# Patient Record
Sex: MALE | Race: WHITE | URBAN - METROPOLITAN AREA
[De-identification: names, ages, dates, MRNs, and addresses within clinical notes are randomized per-mention and may not be internally consistent; named-entity substitution may affect disease eponyms.]

---

## 2017-05-09 ENCOUNTER — OFFICE VISIT - HEALTHEAST (OUTPATIENT)
Dept: FAMILY MEDICINE | Facility: CLINIC | Age: 32
End: 2017-05-09

## 2017-05-09 DIAGNOSIS — J06.9 URI (UPPER RESPIRATORY INFECTION): ICD-10-CM

## 2017-05-19 ENCOUNTER — OFFICE VISIT - HEALTHEAST (OUTPATIENT)
Dept: FAMILY MEDICINE | Facility: CLINIC | Age: 32
End: 2017-05-19

## 2017-05-19 DIAGNOSIS — R05.9 COUGH: ICD-10-CM

## 2017-06-28 ENCOUNTER — OFFICE VISIT - HEALTHEAST (OUTPATIENT)
Dept: FAMILY MEDICINE | Facility: CLINIC | Age: 32
End: 2017-06-28

## 2017-06-28 DIAGNOSIS — R10.32 LLQ ABDOMINAL PAIN: ICD-10-CM

## 2017-06-28 DIAGNOSIS — K57.92 DIVERTICULITIS: ICD-10-CM

## 2017-06-29 ENCOUNTER — COMMUNICATION - HEALTHEAST (OUTPATIENT)
Dept: FAMILY MEDICINE | Facility: CLINIC | Age: 32
End: 2017-06-29

## 2017-06-29 DIAGNOSIS — R05.9 COUGH: ICD-10-CM

## 2018-01-15 ENCOUNTER — OFFICE VISIT - HEALTHEAST (OUTPATIENT)
Dept: FAMILY MEDICINE | Facility: CLINIC | Age: 33
End: 2018-01-15

## 2018-01-15 DIAGNOSIS — R50.9 FEVER: ICD-10-CM

## 2018-01-15 LAB
FLUAV AG SPEC QL IA: NORMAL
FLUBV AG SPEC QL IA: NORMAL

## 2018-01-18 ENCOUNTER — OFFICE VISIT - HEALTHEAST (OUTPATIENT)
Dept: FAMILY MEDICINE | Facility: CLINIC | Age: 33
End: 2018-01-18

## 2018-01-18 ENCOUNTER — COMMUNICATION - HEALTHEAST (OUTPATIENT)
Dept: FAMILY MEDICINE | Facility: CLINIC | Age: 33
End: 2018-01-18

## 2018-01-18 DIAGNOSIS — R12 HEARTBURN: ICD-10-CM

## 2018-01-18 DIAGNOSIS — R05.9 COUGH: ICD-10-CM

## 2018-01-18 DIAGNOSIS — J06.9 URI (UPPER RESPIRATORY INFECTION): ICD-10-CM

## 2018-01-18 RX ORDER — ALBUTEROL SULFATE 90 UG/1
2 AEROSOL, METERED RESPIRATORY (INHALATION) EVERY 4 HOURS PRN
Qty: 1 INHALER | Refills: 0 | Status: SHIPPED | OUTPATIENT
Start: 2018-01-18 | End: 2022-06-29

## 2018-01-23 ENCOUNTER — OFFICE VISIT - HEALTHEAST (OUTPATIENT)
Dept: FAMILY MEDICINE | Facility: CLINIC | Age: 33
End: 2018-01-23

## 2018-01-23 DIAGNOSIS — R05.9 COUGH: ICD-10-CM

## 2018-04-15 ENCOUNTER — COMMUNICATION - HEALTHEAST (OUTPATIENT)
Dept: FAMILY MEDICINE | Facility: CLINIC | Age: 33
End: 2018-04-15

## 2018-04-15 DIAGNOSIS — R12 HEARTBURN: ICD-10-CM

## 2018-06-01 ENCOUNTER — OFFICE VISIT - HEALTHEAST (OUTPATIENT)
Dept: FAMILY MEDICINE | Facility: CLINIC | Age: 33
End: 2018-06-01

## 2018-06-01 DIAGNOSIS — R05.9 COUGH: ICD-10-CM

## 2018-06-01 RX ORDER — CODEINE PHOSPHATE AND GUAIFENESIN 10; 100 MG/5ML; MG/5ML
10 SOLUTION ORAL
Qty: 250 ML | Refills: 0 | Status: SHIPPED | OUTPATIENT
Start: 2018-06-01 | End: 2022-06-29

## 2018-06-01 RX ORDER — AZITHROMYCIN 250 MG/1
TABLET, FILM COATED ORAL
Qty: 6 TABLET | Refills: 0 | Status: SHIPPED | OUTPATIENT
Start: 2018-06-01 | End: 2022-06-29

## 2018-07-18 ENCOUNTER — COMMUNICATION - HEALTHEAST (OUTPATIENT)
Dept: HEALTH INFORMATION MANAGEMENT | Facility: CLINIC | Age: 33
End: 2018-07-18

## 2018-07-18 ENCOUNTER — COMMUNICATION - HEALTHEAST (OUTPATIENT)
Dept: TELEHEALTH | Facility: CLINIC | Age: 33
End: 2018-07-18

## 2018-10-22 ENCOUNTER — OFFICE VISIT - HEALTHEAST (OUTPATIENT)
Dept: FAMILY MEDICINE | Facility: CLINIC | Age: 33
End: 2018-10-22

## 2018-10-22 DIAGNOSIS — M26.609 TMJ (TEMPOROMANDIBULAR JOINT SYNDROME): ICD-10-CM

## 2019-11-08 ENCOUNTER — OFFICE VISIT - HEALTHEAST (OUTPATIENT)
Dept: FAMILY MEDICINE | Facility: CLINIC | Age: 34
End: 2019-11-08

## 2019-11-08 DIAGNOSIS — H10.32 ACUTE BACTERIAL CONJUNCTIVITIS OF LEFT EYE: ICD-10-CM

## 2020-12-11 ENCOUNTER — COMMUNICATION - HEALTHEAST (OUTPATIENT)
Dept: SCHEDULING | Facility: CLINIC | Age: 35
End: 2020-12-11

## 2020-12-12 ENCOUNTER — OFFICE VISIT - HEALTHEAST (OUTPATIENT)
Dept: FAMILY MEDICINE | Facility: CLINIC | Age: 35
End: 2020-12-12

## 2020-12-12 DIAGNOSIS — R31.9 BLOOD IN URINE: ICD-10-CM

## 2020-12-12 LAB
ALBUMIN UR-MCNC: NEGATIVE MG/DL
APPEARANCE UR: CLEAR
BILIRUB UR QL STRIP: NEGATIVE
COLOR UR AUTO: YELLOW
GLUCOSE UR STRIP-MCNC: NEGATIVE MG/DL
HGB UR QL STRIP: NEGATIVE
KETONES UR STRIP-MCNC: NEGATIVE MG/DL
LEUKOCYTE ESTERASE UR QL STRIP: NEGATIVE
NITRATE UR QL: NEGATIVE
PH UR STRIP: 5 [PH] (ref 5–8)
SP GR UR STRIP: 1.01 (ref 1–1.03)
UROBILINOGEN UR STRIP-ACNC: NORMAL

## 2021-05-01 ENCOUNTER — HEALTH MAINTENANCE LETTER (OUTPATIENT)
Age: 36
End: 2021-05-01

## 2021-05-30 VITALS — WEIGHT: 280 LBS | BODY MASS INDEX: 35 KG/M2

## 2021-05-30 VITALS — WEIGHT: 277 LBS | BODY MASS INDEX: 34.62 KG/M2

## 2021-05-31 ENCOUNTER — RECORDS - HEALTHEAST (OUTPATIENT)
Dept: ADMINISTRATIVE | Facility: CLINIC | Age: 36
End: 2021-05-31

## 2021-05-31 VITALS — BODY MASS INDEX: 34.84 KG/M2 | WEIGHT: 278.7 LBS

## 2021-05-31 VITALS — BODY MASS INDEX: 34.7 KG/M2 | WEIGHT: 277.6 LBS

## 2021-05-31 VITALS — BODY MASS INDEX: 35.87 KG/M2 | WEIGHT: 287 LBS

## 2021-06-01 VITALS — WEIGHT: 273.7 LBS | BODY MASS INDEX: 34.21 KG/M2

## 2021-06-02 VITALS — WEIGHT: 279 LBS | BODY MASS INDEX: 34.87 KG/M2

## 2021-06-03 VITALS
SYSTOLIC BLOOD PRESSURE: 144 MMHG | BODY MASS INDEX: 34.85 KG/M2 | TEMPERATURE: 98.2 F | OXYGEN SATURATION: 97 % | WEIGHT: 278.8 LBS | RESPIRATION RATE: 18 BRPM | HEART RATE: 77 BPM | DIASTOLIC BLOOD PRESSURE: 90 MMHG

## 2021-06-03 NOTE — PROGRESS NOTES
Chief Complaint   Patient presents with     Eye Drainage     and redness, started this morning, swelling,          HPI:      Patient is here for redness of left eye wit discharge started 10 am today. No eye pain, foreign body sensation, photophobia, fever. He wears contact lenses.    ROS: Pertinent ROS noted in HPI.     No Known Allergies    There is no problem list on file for this patient.      Family History   Problem Relation Age of Onset     Cancer Maternal Grandmother      Diabetes Maternal Grandmother      Cancer Paternal Grandmother      COPD Paternal Grandfather      Heart disease Paternal Grandfather        Social History     Socioeconomic History     Marital status:      Spouse name: Marlyn     Number of children: 1     Years of education: Not on file     Highest education level: Not on file   Occupational History     Occupation:  in [employer]   Social Needs     Financial resource strain: Not on file     Food insecurity:     Worry: Not on file     Inability: Not on file     Transportation needs:     Medical: Not on file     Non-medical: Not on file   Tobacco Use     Smoking status: Never Smoker     Smokeless tobacco: Never Used   Substance and Sexual Activity     Alcohol use: Yes     Alcohol/week: 0.0 - 1.7 standard drinks     Drug use: No     Sexual activity: Yes     Partners: Female   Lifestyle     Physical activity:     Days per week: Not on file     Minutes per session: Not on file     Stress: Not on file   Relationships     Social connections:     Talks on phone: Not on file     Gets together: Not on file     Attends Orthodox service: Not on file     Active member of club or organization: Not on file     Attends meetings of clubs or organizations: Not on file     Relationship status: Not on file     Intimate partner violence:     Fear of current or ex partner: Not on file     Emotionally abused: Not on file     Physically abused: Not on file     Forced sexual  activity: Not on file   Other Topics Concern     Not on file   Social History Narrative     Not on file         Objective:    Vitals:    11/08/19 1741   BP: (!) 154/101   Pulse: 77   Resp: 18   Temp: 98.2  F (36.8  C)   SpO2: 97%       Gen:NAD  Eyes: moderate left conjunctival erythema with small purulent discharge. Corneas grossly clear. Normal right conjunctiva. Left eyelids slightly swollen. PERRLA, EOMI. No periorbital tenderness to palpation.       Acute bacterial conjunctivitis of left eye  -     erythromycin ophthalmic ointment; Administer to left eye four times daily for seven days.    Advised patient to not wear contacts until symptoms resolved. F/u with [facility] in 2 days if no improvement, sooner in ER should symptoms escalate.

## 2021-06-03 NOTE — PATIENT INSTRUCTIONS - HE
Follow up in the Emergency Department if your symptoms worsen.    Avoid contact lenses until your symptoms resolved for 24 hours.

## 2021-06-05 VITALS
TEMPERATURE: 98.4 F | HEART RATE: 80 BPM | BODY MASS INDEX: 35.87 KG/M2 | OXYGEN SATURATION: 96 % | RESPIRATION RATE: 18 BRPM | WEIGHT: 287 LBS | SYSTOLIC BLOOD PRESSURE: 158 MMHG | DIASTOLIC BLOOD PRESSURE: 108 MMHG

## 2021-06-10 NOTE — PROGRESS NOTES
Subjective:      Patient ID: Addi Sherman is a 31 y.o. male.    Chief Complaint:    HPI  Addi Sherman is a 31 y.o. male who presents today complaining of cough for one week. He did initially have sore throat and drainage but that is improving.  The cough has been persistent.  He is using a neti pot.  No fever that he has noticed.  He isn't sure if he has any allergies but has had symptoms like this every spring.  His daughter did recently have a cold.  He has had a history of wheezing last spring with an infection.  He has been using his albuterol some with this illness and it has been helpful.  Last dose of albuterol was this morning.      History reviewed. No pertinent past medical history.    Past Surgical History:   Procedure Laterality Date     TONSILLECTOMY AND ADENOIDECTOMY         Family History   Problem Relation Age of Onset     Cancer Maternal Grandmother      Diabetes Maternal Grandmother      Cancer Paternal Grandmother      COPD Paternal Grandfather      Heart disease Paternal Grandfather        Social History   Substance Use Topics     Smoking status: Never Smoker     Smokeless tobacco: Never Used     Alcohol use 0.0 - 1.0 oz/week     0 - 2 drink(s) per week       Review of Systems    Objective:     /80  Pulse 74  Temp 98.6  F (37  C) (Oral)   Resp 16  Wt (!) 277 lb (125.6 kg)  SpO2 99%  BMI 34.62 kg/m2    Physical Exam   Constitutional: He appears well-developed and well-nourished. No distress.   HENT:   Right Ear: Tympanic membrane and ear canal normal.   Left Ear: Tympanic membrane and ear canal normal.   Mouth/Throat: Oropharynx is clear and moist and mucous membranes are normal.   Eyes: Conjunctivae are normal.   Neck: Normal range of motion. Neck supple.   Cardiovascular: Normal rate and regular rhythm.    No murmur heard.  Pulmonary/Chest: Effort normal and breath sounds normal. No respiratory distress. He has no wheezes. He has no rales.   Lymphadenopathy:     He has no  cervical adenopathy.   Skin: No rash noted.     Procedures      Assessment / Plan:     1. URI (upper respiratory infection)  codeine-guaiFENesin (GUAIFENESIN AC)  mg/5 mL liquid    predniSONE (DELTASONE) 20 MG tablet       I suspect a viral URI.  Symptoms would also be consistent with allergies but given that both his wife and daughter have had similar symptoms and infectious etiology is more likely.  They are both improving with symptomatic treatment.  He does have some underlying bronchospasm and I suspect that is part of the reason his symptoms are persistent.  Going to treat him with prednisone 20 mg daily for 5 days.  Other symptomatic treatment as below.    Patient Instructions   1) Prednisone 20 mg daily for 5 days.  2) Robitussin AC 1-2 tsp every 4 hours as needed for cough.  Do not drive after taking this medication.  3) Follow up in 7-10 days if not improving, sooner if worsening or other concerns.   4) Continue Albuterol every 4 hours as needed.

## 2021-06-10 NOTE — PROGRESS NOTES
Chief Complaint   Patient presents with     Cough     Cold/Cough not going away        HPI:    Patient is here for almost 3 wks of cough productive with yellow sputum, with nasal congestion. NO fever, chest pain, shortness of breath. He was seen and treated with prednisone, and Guiafenesin AC without any change in the cough, although the cough med relives his cough temporarily.    ROS: Pertinent ROS noted in HPI.     No Known Allergies    There is no problem list on file for this patient.      Family History   Problem Relation Age of Onset     Cancer Maternal Grandmother      Diabetes Maternal Grandmother      Cancer Paternal Grandmother      COPD Paternal Grandfather      Heart disease Paternal Grandfather      Social History     Social History     Marital status:      Spouse name: Marlyn     Number of children: 1     Years of education: N/A     Occupational History      in [employer], [place]      Social History Main Topics     Smoking status: Never Smoker     Smokeless tobacco: Never Used     Alcohol use 0.0 - 1.0 oz/week     0 - 2 drink(s) per week     Drug use: No     Sexual activity: Yes     Partners: Female     Other Topics Concern     Not on file     Social History Narrative       Objective:    Vitals:    05/19/17 1438   BP: 120/80   Pulse: 80   Resp: 18   Temp: 98.6  F (37  C)   SpO2: 99%       Gen: NAD  Oropharynx: normal  Nose No discharge  CV: RRR,no M, R, G  Pulm: CTAB, normal effort    CXR - no focal infiltrates to suggest pneumonia a per my interpretation, discussed during visit.      Cough  -     XR Chest PA and Lateral  -     omeprazole (PRILOSEC) 20 MG capsule; Take 1 capsule (20 mg total) by mouth Daily before breakfast.    Discussed differential diagnoses with patient, including GERD vs prolong viral URI vs others. Will start PPI and have patient f/u with PCP next week if no improvement.

## 2021-06-11 NOTE — PROGRESS NOTES
Assessment:     1. Diverticulitis  ciprofloxacin HCl (CIPRO) 500 MG tablet    metroNIDAZOLE (FLAGYL) 500 MG tablet   2. LLQ abdominal pain  HM1(CBC and Differential)    HM1 (CBC with Diff)          Plan:     Left lower quadrant abdominal pain presumed to be diverticulitis.  Given that he is not having fevers and a normal white blood count, I do not think a CT scan is necessary and we will treat him empirically with ciprofloxacin and metronidazole.  I did advise him however that if he starts developing fevers, worsening pain, vomiting, or feeling like he is getting worse and not improving, I advised that he follow-up immediately and he expresses understanding.  For now recommend following a liquid diet over the next 24 hours and advance diet slowly sticking with very bland foods initially such as pasta, toast, potatoes, etc.  Discussed the importance of adequate hydration.    Subjective:       31 y.o. male with a history of diverticulitis on a couple of different occasions presents for evaluation of left lower quadrant pain that started this morning.  He states that it feels similar.  He has not had any fevers and not been doing any heavy lifting.  He denies any back pain, flank pain, or urinary symptoms.  He denies any blood in his stool or urine.  Pain is constant and does not radiate.  Describes the pain as a 6 out of 10.  Nothing makes it better or worse.  No other complaints or concerns today.    The following portions of the patient's history were reviewed and updated as appropriate: allergies, current medications, past family history, past medical history, past social history, past surgical history and problem list.    Review of Systems  A 12 point comprehensive review of systems was negative except as noted.     Objective:      /86  Pulse 92  Temp 98.5  F (36.9  C) (Oral)   Resp 12  Wt (!) 278 lb 11.2 oz (126.4 kg)  SpO2 97%  BMI 34.84 kg/m2  General appearance: alert, appears stated age and  cooperative  Throat: lips, mucosa, and tongue normal; teeth and gums normal  Neck: no adenopathy, supple, symmetrical, trachea midline and thyroid not enlarged, symmetric, no tenderness/mass/nodules  Lungs: clear to auscultation bilaterally  Heart: regular rate and rhythm, S1, S2 normal, no murmur, click, rub or gallop  Abdomen: Left lower quadrant tenderness noted without rebound or guarding.  No other abdominal tenderness.  Bowel sounds are present in all 4 quadrants.  No organomegaly noted.  No distention noted  Extremities: extremities normal, atraumatic, no cyanosis or edema     Recent Results (from the past 24 hour(s))   HM1 (CBC with Diff)   Result Value Ref Range    WBC 9.7 4.0 - 11.0 thou/uL    RBC 4.95 4.40 - 6.20 mill/uL    Hemoglobin 15.6 14.0 - 18.0 g/dL    Hematocrit 44.7 40.0 - 54.0 %    MCV 90 80 - 100 fL    MCH 31.5 27.0 - 34.0 pg    MCHC 34.8 32.0 - 36.0 g/dL    RDW 11.5 11.0 - 14.5 %    Platelets 259 140 - 440 thou/uL    MPV 6.9 (L) 7.0 - 10.0 fL    Neutrophils % 66 50 - 70 %    Lymphocytes % 24 20 - 40 %    Monocytes % 6 2 - 10 %    Eosinophils % 2 0 - 6 %    Basophils % 1 0 - 2 %    Neutrophils Absolute 6.4 2.0 - 7.7 thou/uL    Lymphocytes Absolute 2.4 0.8 - 4.4 thou/uL    Monocytes Absolute 0.6 0.0 - 0.9 thou/uL    Eosinophils Absolute 0.2 0.0 - 0.4 thou/uL    Basophils Absolute 0.1 0.0 - 0.2 thou/uL         This note has been dictated using voice recognition software. Any grammatical or context distortions are unintentional and inherent to the software

## 2021-06-13 NOTE — TELEPHONE ENCOUNTER
Patient stated that he noticed he has blood in his urine.     No burning or pain with urination.  Just started now.     No fever. Pinkish.     Advised see PCP within 24 hours.  Transfer to scheduling.     Monica Soliz RN/[facility]    COVID 19 Nurse Triage Plan/Patient Instructions    Please be aware that novel coronavirus (COVID-19) may be circulating in the community. If you develop symptoms such as fever, cough, or SOB or if you have concerns about the presence of another infection including coronavirus (COVID-19), please contact your health care provider or visit [email or web address].     Disposition/Instructions    Virtual Visit with provider recommended. Reference Visit Selection Guide.    Thank you for taking steps to prevent the spread of this virus.  o Limit your contact with others.  o Wear a simple mask to cover your cough.  o Wash your hands well and often.    Resources    [facility]: About COVID-19: [email or web address]    CDC: What to Do If You're Sick: www.cdc.gov/coronavirus/2019-ncov/about/steps-when-sick.html    CDC: Ending Home Isolation: www.cdc.gov/coronavirus/2019-ncov/hcp/disposition-in-home-patients.html     CDC: Caring for Someone: www.cdc.gov/coronavirus/2019-ncov/if-you-are-sick/care-for-someone.html     [facility]: Interim Guidance for Hospital Discharge to Home: www.health.[facility].mn.us/diseases/coronavirus/hcp/hospdischarge.pdf    [facility] clinical trials (COVID-19 research studies): [email or web address]/[email or web address]-trials     Below are the COVID-19 hotlines at the [facility] ([facility]). Interpreters are available.   o For health questions: Call 319-287-4899 or 1-851.687.3814 (7 a.m. to 7 p.m.)  o For questions about schools and childcare: Call 484-126-9568 or 1-679.112.6137 (7 a.m. to 7 p.m.)       Reason for Disposition    Blood in urine  (Exception: could be normal menstrual bleeding)    Additional Information    Negative:  Shock suspected (e.g., cold/pale/clammy skin, too weak to stand, low BP, rapid pulse)    Negative: Sounds like a life-threatening emergency to the triager    Blood in the urine is main symptom    Negative: Shock suspected (e.g., cold/pale/clammy skin, too weak to stand, low BP, rapid pulse)    Negative: Sounds like a life-threatening emergency to the triager    Negative: Urinary catheter, questions about    Negative: Recent back or abdominal injury    Negative: Recent genital injury    Negative: [1] Unable to urinate (or only a few drops) > 4 hours AND [2] bladder feels very full (e.g., palpable bladder or strong urge to urinate)    Negative: Passing pure blood or large blood clots (i.e., size > a dime) (Exception: raquel or small strands)    Negative: Fever > 100.5 F (38.1 C)    Negative: Patient sounds very sick or weak to the triager    Negative: Known sickle cell disease    Negative: Taking Coumadin (warfarin) or other strong blood thinner, or known bleeding disorder (e.g., thrombocytopenia)    Negative: Side (flank) or back pain present    Negative: Pain or burning with passing urine    Negative: [1] Pink or red-colored urine and likely from food (beets, rhubarb, red food dye) AND [2] lasts > 24 hours after stopping food    Protocols used: URINE - BLOOD IN-A-AH, URINARY SYMPTOMS-A-AH

## 2021-06-13 NOTE — PROGRESS NOTES
Chief Complaint   Patient presents with     Hematuria     x Last night       HPI:  Addi Sherman is a 35 y.o. male who presents today complaining of hematuria that started last night. This hasn't happened before. No personal or family hx of kidney stones. Patient has urinated three times since the episode of hematuria and all of them have been normal. (-) chills, fever, abdominal pain, flank pain, nausea, vomiting, dysuria, urinary frequency, penile pain, testicular pain.     History obtained from the patient.    Problem List:  There are no relevant problems documented for this patient.      No past medical history on file.    Social History     Tobacco Use     Smoking status: Never Smoker     Smokeless tobacco: Never Used   Substance Use Topics     Alcohol use: Yes     Alcohol/week: 0.0 - 1.7 standard drinks       Review of Systems   Constitutional: Negative for chills and fever.   Gastrointestinal: Negative for abdominal pain, nausea and vomiting.   Genitourinary: Positive for hematuria. Negative for dysuria, flank pain, frequency, penile pain and testicular pain.       Vitals:    12/12/20 0814 12/12/20 0817   BP: 146/88 (!) 158/108   Patient Site: Right Arm Left Arm   Patient Position: Sitting Sitting   Cuff Size: Adult Large Adult Large   Pulse: 80    Resp: 18    Temp: 98.4  F (36.9  C)    TempSrc: Oral    SpO2: 96%    Weight: (!) 287 lb (130.2 kg)        Physical Exam  Vitals signs and nursing note reviewed.   Constitutional:       General: He is not in acute distress.     Appearance: He is well-developed. He is not diaphoretic.   HENT:      Head: Normocephalic and atraumatic.      Right Ear: External ear normal.      Left Ear: External ear normal.   Eyes:      General:         Right eye: No discharge.         Left eye: No discharge.      Conjunctiva/sclera: Conjunctivae normal.   Pulmonary:      Effort: Pulmonary effort is normal. No respiratory distress.   Abdominal:      General: Abdomen is flat. There is  no distension.      Tenderness: There is no abdominal tenderness. There is no right CVA tenderness, left CVA tenderness, guarding or rebound.   Neurological:      Mental Status: He is alert.   Psychiatric:         Behavior: Behavior normal.         Thought Content: Thought content normal.         Judgment: Judgment normal.           Labs:  Recent Results (from the past 72 hour(s))   Urinalysis-UC if Indicated   Result Value Ref Range    Color, UA Yellow Colorless, Yellow, Straw, Light Yellow    Clarity, UA Clear Clear    Glucose, UA Negative Negative    Bilirubin, UA Negative Negative    Ketones, UA Negative Negative    Specific Gravity, UA 1.015 1.005 - 1.030    Blood, UA Negative Negative    pH, UA 5.0 5.0 - 8.0    Protein, UA Negative Negative mg/dL    Urobilinogen, UA 0.2 E.U./dL 0.2 E.U./dL, 1.0 E.U./dL    Nitrite, UA Negative Negative    Leukocytes, UA Negative Negative       Clinical Decision Making:  Patient reports one episode of hematuria. UA is currently neg for blood or signs of infection. Etiology includes but is not limited to kidney stones, UTI, nephrotic syndrome, bladder CA. Patients symptoms were transient, and he denies any other associated symptoms. Today we will hold off on any further testing, but patient was instructed to follow up if blood returns.   At the end of the encounter, I discussed results, diagnosis, medications. Discussed red flags for immediate return to clinic/ER, as well as indications for follow up if no improvement. Patient understood and agreed to plan. Patient was stable for discharge.    1. Blood in urine  Urinalysis-UC if Indicated         Patient Instructions   Your urine is negative for any signs of infection or blood. Follow up if symptoms occur again.

## 2021-06-15 NOTE — PROGRESS NOTES
Assessment/Plan:        Diagnoses and all orders for this visit:    Heartburn  -     omeprazole (PRILOSEC) 20 MG capsule; Take 1 capsule (20 mg total) by mouth daily before breakfast.  Dispense: 90 capsule; Refill: 0    Cough  98% on room air and course rhonchi noted in Left lower lobe.   Chest xray completed in OV today, I do suspect pneumonia.   Albuterol nebulizer given in office visit - improvement in symptoms.   Will treat with albuterol inhaler, 2 puffs every 4- 6 hours regularly through current symptoms  I will also treat with Zithromax, given suspect pneumonia post influenza like illness.   Continue with Tamiflu as prescribed.   Discussed side effects of medications and proper use. Patient verbalized understanding.  Discussed home supportive care and recommended rest, fluids/ warm fluids, humidification, saline nasal spray, throat lozenges, gargle with warm salt water.    Gollow up if worsening symptoms, questions or concerns  I do recommend close follow up, early next week and he was scheduled for 1/23/2017at 0740  Discussed red flags that would warrant urgent evaluation. Patient verbalized understanding.  Addi agreed and appeared pleased with plan      -     XR Chest 2 Views  -     albuterol nebulizer solution 3 mL (PROVENTIL); Take 3 mL by nebulization once.        XR Chest 2 Views   Status:  Final result   Visible to patient:  No (Not Released) Dx:  Cough Order: [id number]         Details        Reading Physician Reading Date Result Priority       Ricci Bourgeois MD 1/18/2018            Narrative             XR CHEST 2 VIEWS  1/18/2018 9:55 AM    INDICATION: abnormal left lower lung (LLL) sounds. r/o pneumonia with recent influenza.  COMPARISON: 5/19/2017    FINDINGS: Limited inspiratory volume. Heart size appears normal. Lungs appear clear.                Subjective:    Patient ID: Addi Sherman is a 32 y.o. male.    HPI Comments: 33 y/o Addi presents today for follow up, was seen on Monday  1/5/2017 - his daughter was diagnosed with influenza on Monday.   He was seen by Dr. Onofre, his influenza was negative but given Tamifly anyway. He has been taking Tamiflu prophylaxis and taking for 10 days.   He present stoday and complains that he feels he is getting worse. States his cough is getting worse. Productive cough - dark green  Endorses diarrhea since Monday 2-3 per day, feels exteremly fatigue.   Denies wheezing, shortness of breath or chest pain, but states his chest does hurt when he coughs.   Denies sinus pressure pain  Denies fever, this has improved.   He is a nonsmoker, denies history  Denies any underlying asthma  He has been taking Tamiflu, robitussin for cough,  nyquil  Staying home from work.  His daughter was recently diagnosed with influenza, but she is improved with Tamiflu      Cough   Associated symptoms include chills, coughing, fatigue and a fever (improved). Pertinent negatives include no congestion, nausea or vomiting.       The following portions of the patient's history were reviewed and updated as appropriate: allergies, current medications, past family history, past medical history, past social history, past surgical history and problem list.    Review of Systems   Constitutional: Positive for chills, fatigue and fever (improved).   HENT: Positive for rhinorrhea. Negative for congestion, ear discharge, ear pain, sinus pain and sinus pressure.    Eyes: Negative.    Respiratory: Positive for cough. Negative for chest tightness, shortness of breath and wheezing.    Cardiovascular: Negative.    Gastrointestinal: Positive for diarrhea. Negative for nausea and vomiting.   Psychiatric/Behavioral: Negative.              Objective:    Physical Exam   Constitutional: He is oriented to person, place, and time. He appears well-developed and well-nourished. No distress.   Ill appearing   HENT:   Right Ear: External ear normal.   Left Ear: External ear normal.   Rhinorrhea, clear nasal drainage    Eyes: Conjunctivae are normal. Right eye exhibits no discharge. Left eye exhibits no discharge.   Cardiovascular: Normal rate and normal heart sounds.    No murmur heard.  Pulmonary/Chest: Effort normal. No respiratory distress. He has no wheezes. He has rales. He exhibits no tenderness.   Course rhonchi, left lower lobe. All other lobes normal lung sounds   Abdominal: Soft. Bowel sounds are normal. He exhibits no distension. There is no tenderness.   Lymphadenopathy:     He has no cervical adenopathy.   Neurological: He is alert and oriented to person, place, and time.   Skin: No rash noted. He is not diaphoretic.   Psychiatric: He has a normal mood and affect. His behavior is normal. Judgment normal.           Vitals:    01/18/18 0917   BP: 112/70   Patient Site: Left Arm   Patient Position: Sitting   Cuff Size: Adult Large   Pulse: 80   Temp: 98.2  F (36.8  C)   TempSrc: Oral   SpO2: 98%   Weight: (!) 277 lb 9.6 oz (125.9 kg)       Current Outpatient Prescriptions   Medication Sig Dispense Refill     albuterol (PROVENTIL HFA;VENTOLIN HFA) 90 mcg/actuation inhaler Inhale 2 puffs every 4 (four) hours as needed (prn cough). 1 Inhaler 0     omeprazole (PRILOSEC) 20 MG capsule Take 1 capsule (20 mg total) by mouth daily before breakfast. 90 capsule 0     oseltamivir (TAMIFLU) 75 MG capsule Take 1 capsule (75 mg total) by mouth daily for 10 days. 10 capsule 0     codeine-guaiFENesin (GUAIFENESIN AC)  mg/5 mL liquid Take 5-10 mL by mouth every 4 (four) hours as needed for cough. Do not drive or work after taking this medication. 240 mL 0     No current facility-administered medications for this visit.

## 2021-06-15 NOTE — PROGRESS NOTES
"  Subjective:      Addi Sherman is a 32 y.o. male who presents today for follow up for possible influenza.    Addi was seen by Dr. Onofre on 01/15/2018 for possible influenza and was started on Tamiflu after his daughter tested positive for influenza.  His symptoms were getting worse and was seen in the office again on 01/18/2018 and started on Azithromycin and Albuterol inhaler.  Today he states he feels better.  He continues to have a harsh, strong, productive cough of clear sputum.  Denies any more diarrhea, fatigue, fever, chills, or shortness of breath.  He endorses finishing the the Z-pack and using Albuterol every 6 hours.  He also endorses being able to go back to work yesterday since he was feeling \"much better\".  Pt has a history of asthma. Denies any wheezing, shortness of breath or nighttime symptoms.   Denies any worsening symptoms or cough with exertional symptoms.     Reviewed past medical/surgical history, family history, social history, medications and updated chart below.     No Known Allergies  History reviewed. No pertinent past medical history.  Past Surgical History:   Procedure Laterality Date     TONSILLECTOMY AND ADENOIDECTOMY       Social History     Social History     Marital status:      Spouse name: Marlyn     Number of children: 1     Years of education: N/A     Occupational History      in [employer]      Social History Main Topics     Smoking status: Never Smoker     Smokeless tobacco: Never Used     Alcohol use 0.0 - 1.0 oz/week     0 - 2 Standard drinks or equivalent per week     Drug use: No     Sexual activity: Yes     Partners: Female     Other Topics Concern     Not on file     Social History Narrative     Family History   Problem Relation Age of Onset     Cancer Maternal Grandmother      Diabetes Maternal Grandmother      Cancer Paternal Grandmother      COPD Paternal Grandfather      Heart disease Paternal Grandfather      Current " Outpatient Prescriptions   Medication Sig Dispense Refill     albuterol (PROAIR HFA;PROVENTIL HFA;VENTOLIN HFA) 90 mcg/actuation inhaler Inhale 2 puffs every 4 (four) hours as needed (prn cough). 1 Inhaler 0     codeine-guaiFENesin (GUAIFENESIN AC)  mg/5 mL liquid Take 5-10 mL by mouth every 4 (four) hours as needed for cough. Do not drive or work after taking this medication. 118 mL 0     omeprazole (PRILOSEC) 20 MG capsule Take 1 capsule (20 mg total) by mouth daily before breakfast. 90 capsule 0     oseltamivir (TAMIFLU) 75 MG capsule Take 1 capsule (75 mg total) by mouth daily for 10 days. 10 capsule 0     azithromycin (ZITHROMAX Z-EDUAR) 250 MG tablet Take 2 tablets (500 mg) on  Day 1,  followed by 1 tablet (250 mg) once daily on Days 2 through 5. 6 tablet 0     No current facility-administered medications for this visit.      There are no active problems to display for this patient.      Review of Systems   Constitutional: Negative.   HENT: Productive cough of green sputum.  Eyes: Negative.   Respiratory: Negative.   Cardiovascular: Negative.   Gastrointestinal: Negative.   Endocrine: Negative.   Genitourinary: Negative.   Musculoskeletal: Negative.   Skin: Negative.   Allergic/Immunologic: Negative.   Neurological: Negative.   Hematological: Negative.   Psychiatric/Behavioral: Negative.     Objective:    Physical Exam   /88 (Patient Site: Left Arm, Patient Position: Sitting, Cuff Size: Adult Large)  Pulse (!) 101  Temp 97.6  F (36.4  C) (Oral)   SpO2 96%    Constitutional: Alert and oriented x3, well nourished without any acute distress  Cardiovascular: Normal S1 and S2. Regular rate, rhythm and no murmur, rubs or gallops. Palpable distal pulses bilaterally.  Pulmonary/Chest: Normal effort.  Left posterior lung fields are coarse near base.  Right posterior lung fields throughout have expiratory wheezes. Lungs cleared post nebulizer.    Psychiatric: Normal mood and affect.       Assessment/Plan:     1. Cough  He continues to have a strong, harsh, productive cough with coarse lung sounds in left lung base and expiratory wheezes to right lung fields throughout.  O2 saturations in clinic are 96%.  Denies fever, chills, diarrhea, abdominal pain, and shortness of breath.  Discussed doing nebulizer in the clinic and patient is in agreement.  After nebulizer patient was fully improved with consistent O2 saturations 97%.  Continue home supportive care. Continue with albuterol 2 puffs every 4-6 hours as needed.   Follow up if patient has persistent or worsening symptoms.   Discussed red flags that would warrant urgent evaluation. Patient verbalized understanding.  Follow up if worsening symptoms, questions, or concerns.  Recommended annual physical exam or sooner for any acute concerns.     - albuterol nebulizer solution 3 mL (PROVENTIL); Take 3 mL by nebulization once.    Office visit and charting completed with Nya Powell, student NP    Eunice Sotelo, LUIS ALFREDO  1/23/2018

## 2021-06-15 NOTE — PROGRESS NOTES
ASSESSMENT/PLAN:  Pleasant 32 y.o.  male presents with cough and mild fever.  Influenza was negative but due to exposure (daughter has influenza), will treat prophylaticlly with tamiflu.  The patient may continue with OTC symptomatic treatment.  Rest, hydration, nutritional support, and time were counseled.  Follow up with primary care provider if the patient is not better in 1-2 weeks.  The patient verbalized understanding and agreed with the plan.    Fever  -     Influenza A/B Rapid Test  -     oseltamivir (TAMIFLU) 75 MG capsule; Take 1 capsule (75 mg total) by mouth daily for 10 days.  Dispense: 10 capsule; Refill: 0    Orders Placed This Encounter   Procedures     Influenza A/B Rapid Test       CHIEF COMPLAINT:  Chief Complaint   Patient presents with     possible flu     daughter has flu     Cough     Fatigue       HISTORY OF PRESENT ILLNESS:  Addi is a 32 y.o. male presenting to the clinic today with a cough and fatigue. He is accompanied by his wife and daughter.    He woke up this morning feeling fatigued and tired. He began coughing last night but it has become more frequent this morning. He lost his appetite last night and did not feel hungry. He has been trying to drink fluids as much as possible. He has had a couple episodes of emesis last night and this morning. He has been afebrile but feels warm this morning. He denies body chills. He has not received his annual influenza vaccine.    REVIEW OF SYSTEMS:   HENT: Negative.   Eyes: Negative.   Respiratory: Negative. He previously used albuterol for a pneumonia infection a couple years ago. He has not needed it since that time.  Cardiovascular: Negative.   Gastrointestinal: Negative. He previously took omeprazole for GERD but has not needed it lately.  Endocrine: Negative.   Genitourinary: Negative.   Musculoskeletal: Negative.   Skin: Negative.   Allergic/Immunologic: Negative.   Neurological: Negative.   Hematological: Negative.    Psychiatric/Behavioral: Negative.   All other systems are negative.    PFSH:  His daughter was diagnosed with influenza this morning. He and his wife are expecting their second child, a boy, soon.    TOBACCO USE:  History   Smoking Status     Never Smoker   Smokeless Tobacco     Never Used     VITALS:  Vitals:    01/15/18 0835   BP: 118/80   Pulse: (!) 108   Temp: 99  F (37.2  C)   TempSrc: Oral   SpO2: 97%   Weight: (!) 287 lb (130.2 kg)     Wt Readings from Last 3 Encounters:   01/15/18 (!) 287 lb (130.2 kg)   06/28/17 (!) 278 lb 11.2 oz (126.4 kg)   05/19/17 (!) 280 lb (127 kg)     PHYSICAL EXAM:  Constitutional: Patient is oriented to person, place, and time. Patient appears well-developed and well-nourished. No distress.   Right Ear: External ear normal.   Left Ear: External ear normal.   Nose: Nose normal.   Mouth/Throat: Oropharynx is clear and moist. No oropharyngeal exudate.   Eyes: Conjunctivae and EOM are normal. Pupils are equal, round, and reactive to light. Right eye exhibits no discharge. Left eye exhibits no discharge. No scleral icterus.   Neck: Neck supple. No JVD present. No tracheal deviation present. No thyromegaly present.   Cardiovascular: Normal rate, regular rhythm, normal heart sounds and intact distal pulses. No murmur heard.   Pulmonary/Chest: Effort normal and breath sounds normal. No stridor. No respiratory distress. Patient has no wheezes, no rales, exhibits no tenderness.  Lymphadenopathy:  Patient has no cervical adenopathy.    Results for orders placed or performed in visit on 01/15/18   Influenza A/B Rapid Test   Result Value Ref Range    Influenza  A, Rapid Antigen No Influenza A antigen detected No Influenza A antigen detected    Influenza B, Rapid Antigen No Influenza B antigen detected No Influenza B antigen detected     ADDITIONAL HISTORY SUMMARIZED (2): None.  DECISION TO OBTAIN EXTRA INFORMATION (1): None.   RADIOLOGY TESTS (1): None.  LABS (1): Ordered lab.  MEDICINE  TESTS (1): None.  INDEPENDENT REVIEW (2 each): None.     I, Joe Devi, am scribing for and in the presence of, Dr. Onofre.    I, Darius Estrella MD  personally performed the services described in this documentation, as scribed by Joe Devi in my presence, and it is both accurate and complete.    MEDICATIONS:  Current Outpatient Prescriptions   Medication Sig Dispense Refill     albuterol (PROVENTIL HFA;VENTOLIN HFA) 90 mcg/actuation inhaler Inhale 2 puffs every 4 (four) hours as needed (prn cough). 1 Inhaler 0     codeine-guaiFENesin (GUAIFENESIN AC)  mg/5 mL liquid Take 5-10 mL by mouth every 4 (four) hours as needed for cough. Do not drive or work after taking this medication. 240 mL 0     omeprazole (PRILOSEC) 20 MG capsule Take 1 capsule (20 mg total) by mouth daily before breakfast. 90 capsule 0     oseltamivir (TAMIFLU) 75 MG capsule Take 1 capsule (75 mg total) by mouth daily for 10 days. 10 capsule 0     No current facility-administered medications for this visit.        Total data points: 1

## 2021-06-18 NOTE — PROGRESS NOTES
Assessment/Plan:        Diagnoses and all orders for this visit:    Cough  -     azithromycin (ZITHROMAX) 250 MG tablet; Take 500 mg (2 x 250 mg tablets) on day 1 followed by 250 mg (1 tablet) on days 2-5.  Dispense: 6 tablet; Refill: 0  -     codeine-guaiFENesin (GUAIFENESIN AC)  mg/5 mL liquid; Take 10 mL by mouth at bedtime as needed for cough.  Dispense: 250 mL; Refill: 0    Will treat as noted above ,discussed use of other symptom control medications.Follow up as needed.    Subjective:    Patient ID: Addi Sherman is a 32 y.o. male.    Cough   This is a new (Had common cold and URI symptoms about 1 month ago which was better but he continued to cough and it is becoming worse.) problem. The current episode started 1 to 4 weeks ago. The problem occurs constantly. The problem has been gradually worsening. Associated symptoms include coughing, fatigue and headaches. Pertinent negatives include no anorexia, chest pain, chills, congestion, fever, neck pain, sore throat, swollen glands or weakness. The symptoms are aggravated by coughing and exertion. Treatments tried: using inhaler. The treatment provided mild relief.       The following portions of the patient's history were reviewed and updated as appropriate: allergies, current medications, past family history, past medical history, past social history, past surgical history and problem list.    Review of Systems   Constitutional: Positive for fatigue. Negative for chills and fever.   HENT: Positive for postnasal drip. Negative for congestion, ear pain, rhinorrhea, sinus pain, sinus pressure and sore throat.    Respiratory: Positive for cough, chest tightness and shortness of breath. Negative for wheezing.    Cardiovascular: Negative for chest pain.   Gastrointestinal: Negative for anorexia.   Musculoskeletal: Negative for neck pain.   Neurological: Positive for headaches. Negative for weakness and light-headedness.     Vitals:    06/01/18 0848   BP:  124/80   Patient Site: Left Arm   Patient Position: Sitting   Cuff Size: Adult Large   Pulse: 100   Temp: 98.6  F (37  C)   TempSrc: Oral   SpO2: 97%   Weight: (!) 273 lb 11.2 oz (124.1 kg)             Objective:    Physical Exam   Constitutional: He is oriented to person, place, and time. He appears well-developed and well-nourished. No distress.   HENT:   Right Ear: Tympanic membrane normal.   Left Ear: Tympanic membrane normal.  No middle ear effusion.   Nose: Right sinus exhibits no maxillary sinus tenderness and no frontal sinus tenderness. Left sinus exhibits no maxillary sinus tenderness and no frontal sinus tenderness.   Mouth/Throat: Oropharynx is clear and moist.   But has have post nasal drainage.   Cardiovascular: Normal rate and regular rhythm.    Pulmonary/Chest: Effort normal.   Deep breath triggers coughing.No wheezing.Coarse breath sounds.   Neurological: He is alert and oriented to person, place, and time.

## 2021-06-21 NOTE — PROGRESS NOTES
Assessment:     1. TMJ (temporomandibular joint syndrome)            Plan:     Differential diagnosis include but not limited to TMJ, otalgia, otitis externa.  Discussed with the patient on exam finding, no indication for bacterial infection.  At this time is thinking of TMJ since patient has pain with chewing, also when he opens up his mouth.  No signs of otitis externa.  We will treat this with supportive care including ibuprofen for pain or fever.  Monitor for worsening symptoms.  He need to follow-up with PCP if symptoms does not resolve.  Patient verbalized understanding the plan of care.      Subjective:       32 y.o. male presents for evaluation of left ear pain.  Patient reports that the pain started last night.  The pain is on the left side of the face, he has pain in his jaw and right in front of his ear.  He denies any recent air travel, upper respiratory symptoms.  The pain is inside, it hurts to clinch the jaw down, also it hurts when his chewing.  He also admits to pain with opening of the mouth.  He has not taken any medications for the symptoms.    The following portions of the patient's history were reviewed and updated as appropriate: allergies, current medications, past family history, past medical history, past social history, past surgical history and problem list.    Review of Systems  A 12 point comprehensive review of systems was negative except as noted.     Objective:      /84 (Patient Site: Right Arm, Patient Position: Sitting, Cuff Size: Adult Large)  Pulse 78  Temp 97.4  F (36.3  C) (Oral)   Resp 14  Wt (!) 279 lb (126.6 kg)  SpO2 97%  BMI 34.87 kg/m2  General appearance: alert, appears stated age, cooperative and mild distress  Head: Normocephalic, without obvious abnormality, atraumatic  Eyes: conjunctivae/corneas clear. PERRL, EOM's intact. Fundi benign.  Ears: normal TM's and external ear canals both ears and left ear TMJ pain with palpation and with opening and closing  the jaw  Nose: Nares normal. Septum midline. Mucosa normal. No drainage or sinus tenderness.  Throat: lips, mucosa, and tongue normal; teeth and gums normal  Lungs: clear to auscultation bilaterally  Heart: regular rate and rhythm, S1, S2 normal, no murmur, click, rub or gallop  Extremities: extremities normal, atraumatic, no cyanosis or edema  Pulses: 2+ and symmetric  Skin: Skin color, texture, turgor normal. No rashes or lesions  Lymph nodes: Cervical, supraclavicular, and axillary nodes normal.  Neurologic: Grossly normal     This note has been dictated using voice recognition software. Any grammatical or context distortions are unintentional and inherent to the software

## 2021-10-16 ENCOUNTER — HEALTH MAINTENANCE LETTER (OUTPATIENT)
Age: 36
End: 2021-10-16

## 2021-11-26 ENCOUNTER — E-VISIT (OUTPATIENT)
Dept: INTERNAL MEDICINE | Facility: CLINIC | Age: 36
End: 2021-11-26
Payer: COMMERCIAL

## 2021-11-26 DIAGNOSIS — H10.33 ACUTE BACTERIAL CONJUNCTIVITIS OF BOTH EYES: Primary | ICD-10-CM

## 2021-11-26 PROCEDURE — 99421 OL DIG E/M SVC 5-10 MIN: CPT | Performed by: PHYSICIAN ASSISTANT

## 2021-11-26 RX ORDER — POLYMYXIN B SULFATE AND TRIMETHOPRIM 1; 10000 MG/ML; [USP'U]/ML
1-2 SOLUTION OPHTHALMIC EVERY 4 HOURS
Qty: 10 ML | Refills: 0 | Status: SHIPPED | OUTPATIENT
Start: 2021-11-26 | End: 2021-12-03

## 2021-11-26 NOTE — PATIENT INSTRUCTIONS
Thank you for choosing us for your care. I have placed an order for a prescription so that you can start treatment. View your full visit summary for details by clicking on the link below. Your pharmacist will able to address any questions you may have about the medication.     If you re not feeling better within 2-3 days, please schedule an appointment.  You can schedule an appointment right here in [facility], or call [phone]  If the visit is for the same symptoms as your eVisit, we ll refund the cost of your eVisit if seen within seven days.      Bacterial Conjunctivitis    You have an infection in the membranes covering the white part of the eye. This part of the eye is called the conjunctiva. The infection is called conjunctivitis. The most common symptoms of conjunctivitis include a thick, pus-like discharge from the eye, swollen eyelids, redness, eyelids sticking together upon awakening, and a gritty or scratchy feeling in the eye. Your infection was caused by bacteria. It may be treated with medicine. With treatment, the infection takes about 7 to 10 days to resolve.   Home care    Use prescribed antibiotic eye drops or ointment as directed to treat the infection.    Apply a warm compress (towel soaked in warm water) to the affected eye 3 to 4 times a day. Do this just before applying medicine to the eye.    Use a warm, wet cloth to wipe away crusting of the eyelids in the morning. This is caused by mucus drainage during the night. You may also use saline irrigating solution or artificial tears to rinse away mucus in the eye. Do not put a patch over the eye.    Wash your hands before and after touching the infected eye. This is to prevent spreading the infection to the other eye, and to other people. Don't share your towels or washcloths with others.    You may use acetaminophen or ibuprofen to control pain, unless another medicine was prescribed. Talk with your healthcare provider before using these  medicines if you have chronic liver or kidney disease. Also talk with your provider if you have ever had a stomach ulcer or digestive bleeding.    Don't wear contact lenses until your eyes have healed and all symptoms are gone.    Follow-up care  Follow up with your healthcare provider, or as advised.  When to seek medical advice  Call your healthcare provider right away if any of these occur:    Worsening vision    Increasing pain in the eye    Increasing swelling or redness of the eyelid    Redness spreading around the eye  [employer] last reviewed this educational content on 4/1/2020 2000-2021 [employer]. All rights reserved. This information is not intended as a substitute for professional medical care. Always follow your healthcare professional's instructions.

## 2021-11-28 ENCOUNTER — E-VISIT (OUTPATIENT)
Dept: URGENT CARE | Facility: CLINIC | Age: 36
End: 2021-11-28
Payer: COMMERCIAL

## 2021-11-28 DIAGNOSIS — Z20.822 SUSPECTED COVID-19 VIRUS INFECTION: ICD-10-CM

## 2021-11-28 DIAGNOSIS — J01.90 ACUTE SINUSITIS, RECURRENCE NOT SPECIFIED, UNSPECIFIED LOCATION: ICD-10-CM

## 2021-11-28 PROCEDURE — 99421 OL DIG E/M SVC 5-10 MIN: CPT | Performed by: PHYSICIAN ASSISTANT

## 2021-11-28 RX ORDER — OMEGA-3 FATTY ACIDS/FISH OIL 300-1000MG
200 CAPSULE ORAL EVERY 4 HOURS PRN
Qty: 30 CAPSULE | Refills: 0 | Status: SHIPPED | OUTPATIENT
Start: 2021-11-28 | End: 2022-06-29

## 2021-11-28 RX ORDER — GUAIFENESIN 1200 MG/1
1200 TABLET, EXTENDED RELEASE ORAL 2 TIMES DAILY
Qty: 60 TABLET | Refills: 0 | Status: SHIPPED | OUTPATIENT
Start: 2021-11-28 | End: 2022-06-29

## 2021-11-28 NOTE — PATIENT INSTRUCTIONS

## 2021-12-12 ENCOUNTER — OFFICE VISIT (OUTPATIENT)
Dept: FAMILY MEDICINE | Facility: CLINIC | Age: 36
End: 2021-12-12
Payer: COMMERCIAL

## 2021-12-12 VITALS
TEMPERATURE: 98.2 F | HEART RATE: 81 BPM | SYSTOLIC BLOOD PRESSURE: 169 MMHG | DIASTOLIC BLOOD PRESSURE: 98 MMHG | OXYGEN SATURATION: 99 %

## 2021-12-12 DIAGNOSIS — R05.3 PERSISTENT COUGH FOR 3 WEEKS OR LONGER: Primary | ICD-10-CM

## 2021-12-12 DIAGNOSIS — H65.191 ACUTE MEE (MIDDLE EAR EFFUSION), RIGHT: ICD-10-CM

## 2021-12-12 PROCEDURE — 99213 OFFICE O/P EST LOW 20 MIN: CPT | Performed by: FAMILY MEDICINE

## 2021-12-12 RX ORDER — BENZONATATE 100 MG/1
100 CAPSULE ORAL 3 TIMES DAILY PRN
Qty: 30 CAPSULE | Refills: 0 | Status: SHIPPED | OUTPATIENT
Start: 2021-12-12 | End: 2022-06-29

## 2021-12-12 RX ORDER — AZITHROMYCIN 500 MG/1
500 TABLET, FILM COATED ORAL DAILY
Qty: 5 TABLET | Refills: 0 | Status: SHIPPED | OUTPATIENT
Start: 2021-12-12 | End: 2021-12-17

## 2021-12-12 RX ORDER — MOMETASONE FUROATE MONOHYDRATE 50 UG/1
2 SPRAY, METERED NASAL DAILY
Qty: 17 G | Refills: 0 | Status: SHIPPED | OUTPATIENT
Start: 2021-12-12 | End: 2022-06-29

## 2021-12-12 NOTE — PATIENT INSTRUCTIONS
nasonex daily for 2-4 weeks to alleviate pressure in the ear  azithromycin as directed  Continue cough drops as needed  Tessalon perles every 8 hours if needed for cough  Recheck if worse or no better

## 2022-05-22 ENCOUNTER — HEALTH MAINTENANCE LETTER (OUTPATIENT)
Age: 37
End: 2022-05-22

## 2022-06-29 ENCOUNTER — HOSPITAL ENCOUNTER (OUTPATIENT)
Dept: GENERAL RADIOLOGY | Facility: HOSPITAL | Age: 37
Discharge: HOME | End: 2022-06-29
Attending: NURSE PRACTITIONER | Admitting: NURSE PRACTITIONER
Payer: COMMERCIAL

## 2022-06-29 ENCOUNTER — OFFICE VISIT (OUTPATIENT)
Dept: FAMILY MEDICINE | Facility: CLINIC | Age: 37
End: 2022-06-29
Payer: COMMERCIAL

## 2022-06-29 VITALS
BODY MASS INDEX: 34.62 KG/M2 | RESPIRATION RATE: 18 BRPM | HEART RATE: 81 BPM | WEIGHT: 277 LBS | TEMPERATURE: 98.3 F | SYSTOLIC BLOOD PRESSURE: 169 MMHG | DIASTOLIC BLOOD PRESSURE: 98 MMHG | OXYGEN SATURATION: 97 %

## 2022-06-29 DIAGNOSIS — R05.3 PERSISTENT COUGH FOR 3 WEEKS OR LONGER: ICD-10-CM

## 2022-06-29 DIAGNOSIS — R05.8 POST-VIRAL COUGH SYNDROME: Primary | ICD-10-CM

## 2022-06-29 PROCEDURE — 99213 OFFICE O/P EST LOW 20 MIN: CPT | Performed by: NURSE PRACTITIONER

## 2022-06-29 PROCEDURE — 71046 X-RAY EXAM CHEST 2 VIEWS: CPT | Mod: FY

## 2022-06-29 RX ORDER — ALBUTEROL SULFATE 90 UG/1
2 AEROSOL, METERED RESPIRATORY (INHALATION) EVERY 6 HOURS
Qty: 18 G | Refills: 0 | Status: SHIPPED | OUTPATIENT
Start: 2022-06-29

## 2022-06-29 ASSESSMENT — ENCOUNTER SYMPTOMS
MYALGIAS: 0
HEADACHES: 0
CHILLS: 0
COUGH: 1
ACTIVITY CHANGE: 0
FATIGUE: 0
DIARRHEA: 0
APPETITE CHANGE: 0
SORE THROAT: 0
WHEEZING: 0
DYSURIA: 0
FEVER: 0
SHORTNESS OF BREATH: 0
VOMITING: 0

## 2022-06-29 NOTE — PATIENT INSTRUCTIONS
Please follow-up with your primary care provider symptoms not improve in the next 1 to 2 weeks as discussed.

## 2022-06-29 NOTE — PROGRESS NOTES
Patient presents with:  Cough: Cough x 3 weeks      Clinical Decision Making: Focused exam benign, bilateral breath sounds clear, nasal passages clear, bilateral TMs clear as well. Chest x-ray negative.    Clinical presentation consistent with postviral cough syndrome.  Discussed the role of albuterol inhaler as a bronchodilator to assist with persistent cough.  Also discussed the role of OTC antihistamine such as Zyrtec.  Encouraged increased water hydration and rest.  Discussed follow-up with primary care provider if symptoms do not improve over the next 1 to 2 weeks, and when to return for reevaluation should red flag symptoms occur.  Encourage follow-up with primary care provider if blood pressure should continue to be persistently elevated, consider monitoring at home. Letter for work declined. Education provided.      ICD-10-CM    1. Post-viral cough syndrome  R05.8 albuterol (PROAIR HFA/PROVENTIL HFA/VENTOLIN HFA) 108 (90 Base) MCG/ACT inhaler   2. Persistent cough for 3 weeks or longer  R05.3 XR Chest 2 Views       Patient Instructions   Please follow-up with your primary care provider symptoms not improve in the next 1 to 2 weeks as discussed.      HPI: Addi Sherman is a 36 year old male who presents today complaining of persistent cough over the past 3 weeks.  Patient reports that he initially had a viral cold with significant nasal congestion, cough and fatigue symptoms that have now improved, however cough is persistent.  Patient reports taking NyQuil OTC daily with limited relief.  Presents for evaluation of persistent cough. Denies any concerns for COVID exposure or ill contacts. Patient has not taken a home COVID test and is unvaccinated against COVID. Of note patient reports blood pressure is elevated as he has been awake for excess hours due to working a double shift prior to coming to urgent care.    History obtained from the patient.    Problem List:  There are no relevant problems documented  for this patient.      No past medical history on file.    Social History     Tobacco Use     Smoking status: Never Smoker     Smokeless tobacco: Never Used   Substance Use Topics     Alcohol use: Yes     Alcohol/week: 0.0 - 1.7 standard drinks       Review of Systems   Constitutional: Negative for activity change, appetite change, chills, fatigue and fever.   HENT: Negative for congestion and sore throat.    Respiratory: Positive for cough. Negative for shortness of breath and wheezing.    Gastrointestinal: Negative for diarrhea and vomiting.   Genitourinary: Negative for dysuria.   Musculoskeletal: Negative for myalgias.   Neurological: Negative for headaches.       Vitals:    06/29/22 1542   BP: (!) 169/98   Pulse: 81   Resp: 18   Temp: 98.3  F (36.8  C)   TempSrc: Oral   SpO2: 97%   Weight: 125.6 kg (277 lb)       Physical Exam  Constitutional:       Appearance: Normal appearance. He is not ill-appearing or diaphoretic.   HENT:      Head: Normocephalic and atraumatic.      Right Ear: Tympanic membrane, ear canal and external ear normal.      Left Ear: Tympanic membrane, ear canal and external ear normal.      Nose: Nose normal. No congestion or rhinorrhea.      Mouth/Throat:      Mouth: Mucous membranes are moist.      Pharynx: No oropharyngeal exudate or posterior oropharyngeal erythema.   Cardiovascular:      Rate and Rhythm: Normal rate and regular rhythm.      Pulses: Normal pulses.      Heart sounds: Normal heart sounds.   Pulmonary:      Effort: Pulmonary effort is normal.      Breath sounds: Normal breath sounds.   Musculoskeletal:      Cervical back: Neck supple.   Lymphadenopathy:      Cervical: No cervical adenopathy.   Skin:     General: Skin is warm.      Capillary Refill: Capillary refill takes less than 2 seconds.      Findings: No rash.   Neurological:      Mental Status: He is alert and oriented to person, place, and time.   Psychiatric:         Behavior: Behavior normal.         Labs:  Results  for orders placed or performed during the hospital encounter of 06/29/22   XR Chest 2 Views     Status: None    Narrative    EXAM: XR CHEST 2 VW  LOCATION: [facility]  DATE/TIME: 6/29/2022 3:59 PM    INDICATION: Perisistent cough x 3 weeks. Please eval for PNA or other acute process?  COMPARISON: 1/18/2018.      Impression    IMPRESSION: Negative chest.         At the end of the encounter, I discussed results, diagnosis, medications. Discussed red flags for immediate return to clinic/ER, as well as indications for follow up if no improvement. Patient understood and agreed to plan.     FCO Lester CNP

## 2022-09-25 ENCOUNTER — HEALTH MAINTENANCE LETTER (OUTPATIENT)
Age: 37
End: 2022-09-25

## 2022-12-17 ENCOUNTER — OFFICE VISIT (OUTPATIENT)
Dept: FAMILY MEDICINE | Facility: CLINIC | Age: 37
End: 2022-12-17
Payer: COMMERCIAL

## 2022-12-17 VITALS
DIASTOLIC BLOOD PRESSURE: 97 MMHG | WEIGHT: 275.9 LBS | RESPIRATION RATE: 16 BRPM | OXYGEN SATURATION: 97 % | SYSTOLIC BLOOD PRESSURE: 163 MMHG | HEART RATE: 98 BPM | BODY MASS INDEX: 34.49 KG/M2 | TEMPERATURE: 99 F

## 2022-12-17 DIAGNOSIS — R03.0 ELEVATED BLOOD PRESSURE READING WITHOUT DIAGNOSIS OF HYPERTENSION: ICD-10-CM

## 2022-12-17 DIAGNOSIS — R09.81 CONGESTION OF PARANASAL SINUS: Primary | ICD-10-CM

## 2022-12-17 DIAGNOSIS — H92.01 RIGHT EAR PAIN: ICD-10-CM

## 2022-12-17 PROCEDURE — 99213 OFFICE O/P EST LOW 20 MIN: CPT | Performed by: FAMILY MEDICINE

## 2022-12-17 NOTE — PATIENT INSTRUCTIONS
It is important that you monitor your blood pressure and follow-up with your primary care provider if consistent readings greater than 130/90.

## 2022-12-17 NOTE — PROGRESS NOTES
URGENT CARE:    ASSESSMENT AND PLAN:      ICD-10-CM    1. Congestion of paranasal sinus  R09.81       2. Right ear pain  H92.01       3. Elevated blood pressure reading without diagnosis of hypertension  R03.0         Patient's symptoms do not meet criteria for bacterial sinusitis and will continue to have patient treat this symptomatically; measures discussed and printed off for patient in clinic.  Provider did offer to send a watch and wait prescription available to  in the next few days but patient declines this at the time.    Patient's blood pressure is elevated and through chart review a few months ago similar findings.  I have advised patient to monitor blood pressure at home with goal readings less than 130/90 and to follow-up with primary care provider for further evaluation and treatment.  He has no red flag symptoms today needing urgent evaluation for blood pressure.    Follow up with primary care provider with any problems, questions or concerns or if symptoms worsen or fail to improve. Patient verbalized understanding and is agreeable to plan. The patient was discharged ambulatory and in stable condition.    Chief Complaint   Patient presents with     Otalgia     Started this morning, R ear      Nasal Congestion     X2d     SUBJECTIVE:  Addi Sherman is a 37 year old male here with concerns about sinus congestion x2 days in addition to new onset of right ear pain that occurred this morning.   Denies cough, shortness of breath, chest pain, headache, ear drainage, or muffled hearing.  Recent treatment has included: Decongestants and nasal rinses    History reviewed. No pertinent past medical history.  Current Outpatient Medications   Medication Sig Dispense Refill     albuterol (PROAIR HFA/PROVENTIL HFA/VENTOLIN HFA) 108 (90 Base) MCG/ACT inhaler Inhale 2 puffs into the lungs every 6 hours (Patient not taking: Reported on 12/17/2022) 18 g 0     Social History     Tobacco Use     Smoking status:  Never     Smokeless tobacco: Never   Substance Use Topics     Alcohol use: Yes     Alcohol/week: 0.0 - 1.7 standard drinks     No Known Allergies    REVIEW OF SYSTEMS  All systems reviewed and negative except per HPI.    OBJECTIVE:  BP (!) 163/97 (BP Location: Right arm, Patient Position: Right side, Cuff Size: Adult Large)   Pulse 98   Temp 99  F (37.2  C) (Oral)   Resp 16   Wt 125.1 kg (275 lb 14.4 oz)   SpO2 97%   BMI 34.49 kg/m       GENERAL APPEARANCE: healthy, alert and no distress  FACE: sinuses are not tender upon palpation   EYES: PERRL, conjunctiva clear  HENT: Pain with palpation over frontal and maxillary sinuses. Ear canals normal TMs with mild serous effusions bilaterally.  Posterior pharynx is not erythematous.  NECK: supple, nontender, no lymphadenopathy  RESP: lungs clear to auscultation - no rales, rhonchi or wheezes  CV: regular rates and rhythm, normal S1 S2, no murmur noted

## 2023-01-02 ENCOUNTER — E-VISIT (OUTPATIENT)
Dept: URGENT CARE | Facility: URGENT CARE | Age: 38
End: 2023-01-02
Payer: COMMERCIAL

## 2023-01-02 DIAGNOSIS — J01.90 ACUTE SINUSITIS WITH SYMPTOMS > 10 DAYS: Primary | ICD-10-CM

## 2023-01-02 PROCEDURE — 99421 OL DIG E/M SVC 5-10 MIN: CPT | Performed by: NURSE PRACTITIONER

## 2023-01-02 NOTE — PATIENT INSTRUCTIONS
Sinusitis (Antibiotic Treatment)    The sinuses are air-filled spaces within the bones of the face. They connect to the inside of the nose. Sinusitis is an inflammation of the tissue that lines the sinuses. Sinusitis can occur during a cold. It can also happen due to allergies to pollens and other particles in the air. Sinusitis can cause symptoms of sinus congestion and a feeling of fullness. A sinus infection causes fever, headache, and facial pain. There is often green or yellow fluid draining from the nose or into the back of the throat (post-nasal drip). You have been given antibiotics to treat this condition.   Home care    Take the full course of antibiotics as instructed. Don't stop taking them, even when you feel better.    Drink plenty of water, hot tea, and other liquids as directed by the healthcare provider. This may help thin nasal mucus. It also may help your sinuses drain fluids.    Heat may help soothe painful areas of your face. Use a towel soaked in hot water. Or,  the shower and direct the warm spray onto your face. Using a vaporizer along with a menthol rub at night may also help soothe symptoms.     An expectorant with guaifenesin may help thin nasal mucus and help your sinuses drain fluids. Talk with your provider or pharmacists before taking an over-the-counter (OTC) medicine if you have any questions about it or its side effects..    You can use an OTC decongestant, unless a similar medicine was prescribed to you. Nasal sprays work the fastest. Use one that contains phenylephrine or oxymetazoline. First blow your nose gently. Then use the spray. Don't use these medicines more often than directed on the label. If you do, your symptoms may get worse. You may also take pills that contain pseudoephedrine. Don t use products that combine multiple medicines. This is because side effects may be increased. Read labels. You can also ask the pharmacist for help. (People with high blood  pressure should not use decongestants. They can raise blood pressure.) Talk with your provider or pharmacist if you have any questions about the medicine..    OTC antihistamines may help if allergies contributed to your sinusitis. Talk with your provider or pharmacist if you have any questions about the medicine..    Don't use nasal rinses or irrigation during an acute sinus infection, unless your healthcare provider tells you to. Rinsing may spread the infection to other areas in your sinuses.    Use acetaminophen or ibuprofen to control pain, unless another pain medicine was prescribed to you. If you have chronic liver or kidney disease or ever had a stomach ulcer, talk with your healthcare provider before using these medicines. Never give aspirin to anyone under age 18 who is ill with a fever. It may cause severe liver damage.    Don't smoke. This can make symptoms worse.    Follow-up care  Follow up with your healthcare provider, or as advised.   When to seek medical advice  Call your healthcare provider if any of these occur:     Facial pain or headache that gets worse    Stiff neck    Unusual drowsiness or confusion    Swelling of your forehead or eyelids    Symptoms don't go away in 10 days    Vision problems, such as blurred or double vision    Fever of 100.4 F (38 C) or higher, or as directed by your healthcare provider  Call 911  Call 911 if any of these occur:     Seizure    Trouble breathing    Feeling dizzy or faint    Fingernails, skin or lips look blue, purple , or gray  Prevention  Here are steps you can take to help prevent an infection:     Keep good hand washing habits.    Don t have close contact with people who have sore throats, colds, or other upper respiratory infections.    Don t smoke, and stay away from secondhand smoke.    Stay up to date with of your vaccines.  [employer] last reviewed this educational content on 12/1/2019 2000-2021 [employer]. All rights reserved. This  information is not intended as a substitute for professional medical care. Always follow your healthcare professional's instructions.        Dear Addi Sherman    After reviewing your responses, I've been able to diagnose you with Acute sinusitis with symptoms > 10 days.      Based on your responses and diagnosis, I have prescribed   Orders Placed This Encounter     amoxicillin-clavulanate (AUGMENTIN) 875-125 MG tablet    to treat your symptoms. I have sent this to your pharmacy.?     It is also important to stay well hydrated, get lots of rest and take over-the-counter decongestants,?tylenol?or ibuprofen if you?are able to?take those medications per your primary care provider to help relieve discomfort.?     It is important that you take?all of?your prescribed medication even if your symptoms are improving after a few doses.? Taking?all of?your medicine helps prevent the symptoms from returning.?     If your symptoms worsen, you develop severe headache, vomiting, high fever (>102), or are not improving in 7 days, please contact your primary care provider for an appointment or visit any of our convenient Walk-in Care or Urgent Care Centers to be seen which can be found on our website?here.?     Thanks again for choosing?us?as your health care partner,?   ?  FCO Santos CNP?

## 2023-06-04 ENCOUNTER — HEALTH MAINTENANCE LETTER (OUTPATIENT)
Age: 38
End: 2023-06-04

## 2024-02-19 ENCOUNTER — OFFICE VISIT (OUTPATIENT)
Dept: FAMILY MEDICINE | Facility: CLINIC | Age: 39
End: 2024-02-19
Payer: COMMERCIAL

## 2024-02-19 VITALS
HEART RATE: 100 BPM | OXYGEN SATURATION: 97 % | SYSTOLIC BLOOD PRESSURE: 137 MMHG | RESPIRATION RATE: 18 BRPM | TEMPERATURE: 99.7 F | DIASTOLIC BLOOD PRESSURE: 94 MMHG

## 2024-02-19 DIAGNOSIS — J02.0 STREP THROAT: Primary | ICD-10-CM

## 2024-02-19 DIAGNOSIS — J02.9 SORE THROAT: ICD-10-CM

## 2024-02-19 LAB — DEPRECATED S PYO AG THROAT QL EIA: POSITIVE

## 2024-02-19 PROCEDURE — 87880 STREP A ASSAY W/OPTIC: CPT

## 2024-02-19 PROCEDURE — 99213 OFFICE O/P EST LOW 20 MIN: CPT | Performed by: PHYSICIAN ASSISTANT

## 2024-02-19 RX ORDER — PENICILLIN V POTASSIUM 500 MG/1
500 TABLET, FILM COATED ORAL 2 TIMES DAILY
Qty: 20 TABLET | Refills: 0 | Status: SHIPPED | OUTPATIENT
Start: 2024-02-19 | End: 2024-02-29

## 2024-02-20 NOTE — PATIENT INSTRUCTIONS
1) Increase rest and fluid intake.  2) Take Tylenol as needed for fever.   3) Strep infection is considered contagious until treated for 24 hours, avoid attending school, , or work during contagious period.  4) Complete full course of antibiotics.   5) Replace toothbrush after being on the antibiotic for 48 hours to avoid reinfection   6) Return if not resolved in one week or sooner if worsening.

## 2024-02-20 NOTE — PROGRESS NOTES
Patient presents with:  Cough: Has sore throat and cough for 3 days no fever        ICD-10-CM    1. Strep throat  J02.0 penicillin V (VEETID) 500 MG tablet      2. Sore throat  J02.9 Streptococcus A Rapid Screen w/Reflex to PCR - Clinic Collect          Patient Instructions   1) Increase rest and fluid intake.  2) Take Tylenol as needed for fever.   3) Strep infection is considered contagious until treated for 24 hours, avoid attending school, , or work during contagious period.  4) Complete full course of antibiotics.   5) Replace toothbrush after being on the antibiotic for 48 hours to avoid reinfection   6) Return if not resolved in one week or sooner if worsening.      HPI:  Addi Sherman is a 38 year old male who presents today complaining of ST and cough x 3 days. No known fevers.  Patient denies any ear pain.  No recent antibiotics in the last 30 days.    History obtained from the patient.    Problem List:  There are no relevant problems documented for this patient.      No past medical history on file.    Social History     Tobacco Use    Smoking status: Never    Smokeless tobacco: Never   Substance Use Topics    Alcohol use: Yes     Alcohol/week: 0.0 - 1.7 standard drinks of alcohol       Review of Systems    Vitals:    02/19/24 1859   BP: (!) 137/94   Pulse: 100   Resp: 18   Temp: 99.7  F (37.6  C)   TempSrc: Oral   SpO2: 97%       Physical Exam  Vitals and nursing note reviewed.   Constitutional:       General: He is not in acute distress.     Appearance: He is not toxic-appearing or diaphoretic.   HENT:      Head: Normocephalic and atraumatic.      Right Ear: External ear normal.      Left Ear: External ear normal.      Mouth/Throat:      Mouth: Mucous membranes are moist.      Pharynx: Oropharyngeal exudate (Mild) and posterior oropharyngeal erythema present.   Eyes:      Conjunctiva/sclera: Conjunctivae normal.   Pulmonary:      Effort: Pulmonary effort is normal. No respiratory distress.    Neurological:      Mental Status: He is alert.   Psychiatric:         Mood and Affect: Mood normal.         Behavior: Behavior normal.         Thought Content: Thought content normal.         Judgment: Judgment normal.         Results:  Results for orders placed or performed in visit on 02/19/24   Streptococcus A Rapid Screen w/Reflex to PCR - Clinic Collect     Status: Abnormal    Specimen: Throat; Swab   Result Value Ref Range    Group A Strep antigen Positive (A) Negative         At the end of the encounter, I discussed results, diagnosis, medications. Discussed red flags for immediate return to clinic/ER, as well as indications for follow up if no improvement. Patient understood and agreed to plan. Patient was stable for discharge.

## 2024-04-13 ENCOUNTER — OFFICE VISIT (OUTPATIENT)
Dept: FAMILY MEDICINE | Facility: CLINIC | Age: 39
End: 2024-04-13
Payer: COMMERCIAL

## 2024-04-13 VITALS
SYSTOLIC BLOOD PRESSURE: 171 MMHG | BODY MASS INDEX: 34.39 KG/M2 | HEART RATE: 84 BPM | DIASTOLIC BLOOD PRESSURE: 104 MMHG | WEIGHT: 275.1 LBS | TEMPERATURE: 99 F | RESPIRATION RATE: 16 BRPM | OXYGEN SATURATION: 99 %

## 2024-04-13 DIAGNOSIS — J01.00 ACUTE NON-RECURRENT MAXILLARY SINUSITIS: Primary | ICD-10-CM

## 2024-04-13 PROCEDURE — 99213 OFFICE O/P EST LOW 20 MIN: CPT | Performed by: PEDIATRICS

## 2024-04-13 ASSESSMENT — ENCOUNTER SYMPTOMS
RESPIRATORY NEGATIVE: 1
RHINORRHEA: 1
CONSTITUTIONAL NEGATIVE: 1
VOICE CHANGE: 0
SINUS PRESSURE: 1
EYES NEGATIVE: 1
TROUBLE SWALLOWING: 0
SINUS PAIN: 1
FACIAL SWELLING: 0
SORE THROAT: 1
GASTROINTESTINAL NEGATIVE: 1

## 2024-04-13 NOTE — PROGRESS NOTES
Patient presents with:  Sinus Problem: Started last Saturday, runny nose, headache on Monday, no dizziness or fever      Clinical Decision Making:      ICD-10-CM    1. Acute non-recurrent maxillary sinusitis  J01.00 amoxicillin-clavulanate (AUGMENTIN) 875-125 MG tablet      - Prescribed Augmentin and gave instructions to not begin the medication until 4 days of facial tenderness or 10 days of symptoms.   - Advised that he return to clinic in case it does not improve after 48 hours of antibiotics if he starts the antibiotic.    There are no Patient Instructions on file for this visit.    HPI:  Addi Sherman is a 38 year old male who presents today complaining of purulent nasal drainage and stuffiness for 7 days. Facial tenderness for 2 days. No sore throat or cough. No fever.     History obtained from the patient.    Problem List:  There are no relevant problems documented for this patient.      No past medical history on file.    Social History     Tobacco Use    Smoking status: Never    Smokeless tobacco: Never   Substance Use Topics    Alcohol use: Yes     Alcohol/week: 0.0 - 1.7 standard drinks of alcohol       Review of Systems   Constitutional: Negative.    HENT:  Positive for congestion, rhinorrhea, sinus pressure, sinus pain and sore throat. Negative for ear discharge, ear pain, facial swelling, hearing loss, trouble swallowing and voice change.    Eyes: Negative.    Respiratory: Negative.     Gastrointestinal: Negative.        Vitals:    04/13/24 0912   BP: (!) 171/104   Pulse: 84   Resp: 16   Temp: 99  F (37.2  C)   TempSrc: Oral   SpO2: 99%   Weight: 124.8 kg (275 lb 1.6 oz)       Physical Exam  Constitutional:       General: He is not in acute distress.     Appearance: Normal appearance.   HENT:      Head:      Comments: Maxillary and frontal sinus tenderness b/l     Right Ear: Tympanic membrane and ear canal normal.      Left Ear: Tympanic membrane and ear canal normal.      Nose: Congestion and  rhinorrhea present.      Mouth/Throat:      Mouth: Mucous membranes are moist.   Eyes:      Conjunctiva/sclera: Conjunctivae normal.   Cardiovascular:      Rate and Rhythm: Normal rate and regular rhythm.   Musculoskeletal:      Cervical back: Normal range of motion.   Neurological:      Mental Status: He is alert.         Results:  No results found for any visits on 04/13/24.      At the end of the encounter, I discussed results, diagnosis, medications. Discussed indications for follow up if no improvement. Patient understood and agreed to plan. Patient was stable for discharge.    Sumit Ruiz MD, MPH

## 2024-07-20 ENCOUNTER — HEALTH MAINTENANCE LETTER (OUTPATIENT)
Age: 39
End: 2024-07-20

## 2025-01-30 ENCOUNTER — OFFICE VISIT (OUTPATIENT)
Dept: URGENT CARE | Facility: URGENT CARE | Age: 40
End: 2025-01-30
Payer: COMMERCIAL

## 2025-01-30 ENCOUNTER — ANCILLARY PROCEDURE (OUTPATIENT)
Dept: GENERAL RADIOLOGY | Facility: CLINIC | Age: 40
End: 2025-01-30
Attending: FAMILY MEDICINE
Payer: COMMERCIAL

## 2025-01-30 VITALS
BODY MASS INDEX: 34.87 KG/M2 | HEART RATE: 85 BPM | SYSTOLIC BLOOD PRESSURE: 170 MMHG | DIASTOLIC BLOOD PRESSURE: 108 MMHG | OXYGEN SATURATION: 99 % | WEIGHT: 279 LBS | RESPIRATION RATE: 19 BRPM | TEMPERATURE: 98.9 F

## 2025-01-30 DIAGNOSIS — R05.8 PAROXYSMAL COUGH: ICD-10-CM

## 2025-01-30 DIAGNOSIS — J18.9 ATYPICAL PNEUMONIA: Primary | ICD-10-CM

## 2025-01-30 PROCEDURE — 99214 OFFICE O/P EST MOD 30 MIN: CPT | Performed by: FAMILY MEDICINE

## 2025-01-30 RX ORDER — AZITHROMYCIN 250 MG/1
TABLET, FILM COATED ORAL
Qty: 6 TABLET | Refills: 0 | Status: SHIPPED | OUTPATIENT
Start: 2025-01-30 | End: 2025-02-04

## 2025-01-30 RX ORDER — ALBUTEROL SULFATE 90 UG/1
2 INHALANT RESPIRATORY (INHALATION) EVERY 4 HOURS PRN
Qty: 18 G | Refills: 0 | Status: SHIPPED | OUTPATIENT
Start: 2025-01-30

## 2025-01-31 NOTE — PROGRESS NOTES
Assessment/Plan:   1. Paroxysmal cough  ***  - XR Chest 2 Views; Future    2. Atypical pneumonia (Primary)  ***  - azithromycin (ZITHROMAX) 250 MG tablet; Take 2 tablets (500 mg) by mouth daily for 1 day, THEN 1 tablet (250 mg) daily for 4 days.  Dispense: 6 tablet; Refill: 0  - albuterol (PROAIR HFA/PROVENTIL HFA/VENTOLIN HFA) 108 (90 Base) MCG/ACT inhaler; Inhale 2 puffs into the lungs every 4 hours as needed for shortness of breath, wheezing or cough.  Dispense: 18 g; Refill: 0    Albuterol inhaler with spacer 2 puffs every 4 hours as needed.     Azithromycin as directed.     Recheck if not improving.     I discussed red flag symptoms, return precautions to clinic/ER and follow up care with patient/parent.  Expected clinical course, symptomatic cares advised. Questions answered. Patient/parent amenable with plan.        Subjective:     Addi Sherman is a 39 year old male who presents ***    No Known Allergies  Current Outpatient Medications   Medication Sig Dispense Refill    albuterol (PROAIR HFA/PROVENTIL HFA/VENTOLIN HFA) 108 (90 Base) MCG/ACT inhaler Inhale 2 puffs into the lungs every 4 hours as needed for shortness of breath, wheezing or cough. 18 g 0    azithromycin (ZITHROMAX) 250 MG tablet Take 2 tablets (500 mg) by mouth daily for 1 day, THEN 1 tablet (250 mg) daily for 4 days. 6 tablet 0    albuterol (PROAIR HFA/PROVENTIL HFA/VENTOLIN HFA) 108 (90 Base) MCG/ACT inhaler Inhale 2 puffs into the lungs every 6 hours (Patient not taking: Reported on 1/30/2025) 18 g 0     No current facility-administered medications for this visit.     There is no problem list on file for this patient.      Objective:     BP (!) 170/108   Pulse 85   Temp 98.9  F (37.2  C)   Resp 19   Wt 126.6 kg (279 lb)   SpO2 99%   BMI 34.87 kg/m      Physical        Results for orders placed or performed in visit on 01/30/25   XR Chest 2 Views     Status: None    Narrative    EXAM: XR CHEST 2 VIEWS  LOCATION: [facility]  DATE: 1/30/2025    INDICATION: vigorous cough worse after flu like illness  COMPARISON: 1/18/2018      Impression    IMPRESSION: Heart and mediastinal size normal. No acute airspace opacities. No pleural effusion or pneumothorax.       This note has been dictated in part using voice recognition software.  Any grammatical or context distortions are unintentional and inherent to the software.  Please feel free to contact me directly for clarification if needed.

## 2025-01-31 NOTE — PATIENT INSTRUCTIONS
Albuterol inhaler with spacer 2 puffs every 4 hours as needed.     Azithromycin as directed.     Recheck if not improving.

## 2025-05-25 ENCOUNTER — TELEPHONE (OUTPATIENT)
Dept: SCHEDULING | Facility: CLINIC | Age: 40
End: 2025-05-25

## 2025-05-25 ENCOUNTER — OFFICE VISIT (OUTPATIENT)
Dept: URGENT CARE | Facility: URGENT CARE | Age: 40
End: 2025-05-25
Payer: COMMERCIAL

## 2025-05-25 VITALS
OXYGEN SATURATION: 97 % | SYSTOLIC BLOOD PRESSURE: 160 MMHG | RESPIRATION RATE: 16 BRPM | HEART RATE: 96 BPM | WEIGHT: 280 LBS | BODY MASS INDEX: 35 KG/M2 | TEMPERATURE: 99.8 F | DIASTOLIC BLOOD PRESSURE: 104 MMHG

## 2025-05-25 DIAGNOSIS — R05.1 ACUTE COUGH: Primary | ICD-10-CM

## 2025-05-25 DIAGNOSIS — J45.21 MILD INTERMITTENT REACTIVE AIRWAY DISEASE WITH ACUTE EXACERBATION: ICD-10-CM

## 2025-05-25 PROCEDURE — 3077F SYST BP >= 140 MM HG: CPT | Performed by: PHYSICIAN ASSISTANT

## 2025-05-25 PROCEDURE — 99213 OFFICE O/P EST LOW 20 MIN: CPT | Performed by: PHYSICIAN ASSISTANT

## 2025-05-25 PROCEDURE — 3080F DIAST BP >= 90 MM HG: CPT | Performed by: PHYSICIAN ASSISTANT

## 2025-05-25 RX ORDER — PREDNISONE 20 MG/1
40 TABLET ORAL DAILY
Qty: 10 TABLET | Refills: 0 | Status: SHIPPED | OUTPATIENT
Start: 2025-05-25 | End: 2025-05-25

## 2025-05-25 RX ORDER — PREDNISONE 20 MG/1
40 TABLET ORAL DAILY
Qty: 10 TABLET | Refills: 0 | Status: SHIPPED | OUTPATIENT
Start: 2025-05-25 | End: 2025-05-30

## 2025-05-25 RX ORDER — AZITHROMYCIN 250 MG/1
TABLET, FILM COATED ORAL
Qty: 6 TABLET | Refills: 0 | Status: SHIPPED | OUTPATIENT
Start: 2025-05-25 | End: 2025-05-30

## 2025-05-25 RX ORDER — AZITHROMYCIN 250 MG/1
TABLET, FILM COATED ORAL
Qty: 6 TABLET | Refills: 0 | Status: SHIPPED | OUTPATIENT
Start: 2025-05-25 | End: 2025-05-25

## 2025-05-25 NOTE — PROGRESS NOTES
Urgent Care Clinic Visit    Chief Complaint   Patient presents with    Urgent Care     - Started Mon  - Congestion  - Cough              5/25/2025     4:43 PM   Additional Questions   Roomed by Jovan SOTELO   Accompanied by None

## 2025-05-25 NOTE — PATIENT INSTRUCTIONS
OK to start zithromax if not improving after 3 days of the prednisone.    Use albuterol 2-4 puffs every 4 hours.

## 2025-05-25 NOTE — PROGRESS NOTES
"Assessment & Plan     Acute cough    - XR Chest 2 Views  - azithromycin (ZITHROMAX) 250 MG tablet  Dispense: 6 tablet; Refill: 0    Mild intermittent reactive airway disease with acute exacerbation    - azithromycin (ZITHROMAX) 250 MG tablet  Dispense: 6 tablet; Refill: 0  - predniSONE (DELTASONE) 20 MG tablet  Dispense: 10 tablet; Refill: 0       MDM:  pt was seen for cough x 1 week with some short term improvement with his EDU, no fevers, chest pain or SOB.    He is vitally normal. Lungs are clear with a few scattered wheezes.    I did recommend CXR however unable to get CXR due to no radiology this evening.  Discussed trial of prednisone for RAD and continued albuterol.  If not improving in the next 3 days can start Zithromax to cover for secondary bacterial infection/bronchitis vs atypical pna.  If worsening, chest pain, sob, difficulty breathing should be rechecked.        Argenis Mckeon MD  [facility] BERNARDO Fontana is a 39 year old male who presents to clinic today for the following health issues:  Chief Complaint   Patient presents with    Urgent Care     - Started Mon  - Congestion  - Cough         5/25/2025     4:43 PM   Additional Questions   Roomed by Jovan SOTELO   Accompanied by None     Patient is an otherwise healthy 39-year-old male presents to urgent care with concerns regarding cough times approximately 7 days.  Congested, rhionrrhea about 7 days.  He denies any facial pain or tooth pain.  No persistent headaches.  Productive the last 2 days.  Yellow sputum.    No wheezing. Albuterol 2-3 days.  2 puffs every hours  helps some.    No known alleriges.   No fevers.    No chest pain.  No sob.    No current sinus symptoms.  He states the only time he uses the inhalers when he gets \"sick\" this usually occurs 1-2 times per year with the last episode being in January 2025.  Patient denies taking a COVID-19 test at home and defers any testing today.  He has no " history of tobacco use.       Review of Systems  Constitutional, HEENT, cardiovascular, pulmonary, gi and gu systems are negative, except as otherwise noted.      Objective    BP (!) 160/104   Pulse 96   Temp 99.8  F (37.7  C) (Tympanic)   Resp 16   Wt 127 kg (280 lb)   SpO2 97%   BMI 35.00 kg/m    Physical Exam   Pt is in no acute distress and appears well  Ears patent B:  TM s intact, non-injected. All land marks easily visibile    Nasal mucosa is non-edematous, no discharge.    Pharynx: non erythematous, tonsils non hypertrophied, No exudate   Neck supple: no adenopathy  Lungs: few scattered wheezes.    Heart: RRR, no murmur, no thrills or heaves   Ext: no edema  Skin: no rashes    No results found for any visits on 05/25/25.

## 2025-05-26 NOTE — TELEPHONE ENCOUNTER
New Medication Request    Contacts       Contact Date/Time Type Contact Phone/Fax    05/25/2025 07:23 PM CDT Phone (Incoming) Addi Sherman (Self)             What medication are you requesting?: predniSONE (DELTASONE) 20 MG tablet and azithromycin (ZITHROMAX) 250 MG tablet     Reason for medication request: UC visit today     Have you taken this medication before?: NA        Controlled Substance Agreement on file:   CSA -- Patient Level:    CSA: None found at the patient level.         Patient offered an appointment? No    Preferred Pharmacy:  [address] [phone]    Please send to this pharmacy. They close at 10PM. The previous pharmacy was closed.       Could we send this information to you in [facility] or would you prefer to receive a phone call?:   Patient would prefer a phone call   Okay to leave a detailed message?: Yes at Cell number on file:    Telephone Information:   Mobile [phone]

## 2025-05-26 NOTE — TELEPHONE ENCOUNTER
Writer called patient to confirm that medication has been changed to the pharmacy of choice. Patient confirmed understanding.    Rao Payne MA on 5/25/2025 at 7:56 PM

## 2025-06-06 SDOH — HEALTH STABILITY: PHYSICAL HEALTH: ON AVERAGE, HOW MANY DAYS PER WEEK DO YOU ENGAGE IN MODERATE TO STRENUOUS EXERCISE (LIKE A BRISK WALK)?: 1 DAY

## 2025-06-06 SDOH — HEALTH STABILITY: PHYSICAL HEALTH: ON AVERAGE, HOW MANY MINUTES DO YOU ENGAGE IN EXERCISE AT THIS LEVEL?: 30 MIN

## 2025-06-06 ASSESSMENT — SOCIAL DETERMINANTS OF HEALTH (SDOH): HOW OFTEN DO YOU GET TOGETHER WITH FRIENDS OR RELATIVES?: ONCE A WEEK

## 2025-06-11 ENCOUNTER — OFFICE VISIT (OUTPATIENT)
Dept: FAMILY MEDICINE | Facility: CLINIC | Age: 40
End: 2025-06-11
Payer: COMMERCIAL

## 2025-06-11 VITALS
BODY MASS INDEX: 34.19 KG/M2 | HEART RATE: 79 BPM | HEIGHT: 75 IN | SYSTOLIC BLOOD PRESSURE: 167 MMHG | RESPIRATION RATE: 16 BRPM | WEIGHT: 275 LBS | DIASTOLIC BLOOD PRESSURE: 107 MMHG | TEMPERATURE: 98.7 F | OXYGEN SATURATION: 96 %

## 2025-06-11 DIAGNOSIS — I10 PRIMARY HYPERTENSION: ICD-10-CM

## 2025-06-11 DIAGNOSIS — Z00.00 ROUTINE GENERAL MEDICAL EXAMINATION AT A HEALTH CARE FACILITY: ICD-10-CM

## 2025-06-11 DIAGNOSIS — J45.20 MILD INTERMITTENT REACTIVE AIRWAY DISEASE WITHOUT COMPLICATION: ICD-10-CM

## 2025-06-11 DIAGNOSIS — Z11.4 SCREENING FOR HIV (HUMAN IMMUNODEFICIENCY VIRUS): ICD-10-CM

## 2025-06-11 DIAGNOSIS — Z13.6 SCREENING FOR CARDIOVASCULAR CONDITION: ICD-10-CM

## 2025-06-11 DIAGNOSIS — Z11.59 NEED FOR HEPATITIS C SCREENING TEST: ICD-10-CM

## 2025-06-11 DIAGNOSIS — Z13.1 SCREENING FOR DIABETES MELLITUS: Primary | ICD-10-CM

## 2025-06-11 DIAGNOSIS — R06.83 SNORING: ICD-10-CM

## 2025-06-11 LAB
ALBUMIN SERPL BCG-MCNC: 4.4 G/DL (ref 3.5–5.2)
ALP SERPL-CCNC: 72 U/L (ref 40–150)
ALT SERPL W P-5'-P-CCNC: 34 U/L (ref 0–70)
ANION GAP SERPL CALCULATED.3IONS-SCNC: 10 MMOL/L (ref 7–15)
AST SERPL W P-5'-P-CCNC: 26 U/L (ref 0–45)
BASOPHILS # BLD AUTO: 0.1 10E3/UL (ref 0–0.2)
BASOPHILS NFR BLD AUTO: 1 %
BILIRUB SERPL-MCNC: 0.3 MG/DL
BUN SERPL-MCNC: 15.4 MG/DL (ref 6–20)
CALCIUM SERPL-MCNC: 9.3 MG/DL (ref 8.8–10.4)
CHLORIDE SERPL-SCNC: 105 MMOL/L (ref 98–107)
CHOLEST SERPL-MCNC: 199 MG/DL
CREAT SERPL-MCNC: 1.06 MG/DL (ref 0.67–1.17)
CREAT UR-MCNC: 208 MG/DL
EGFRCR SERPLBLD CKD-EPI 2021: >90 ML/MIN/1.73M2
EOSINOPHIL # BLD AUTO: 0.2 10E3/UL (ref 0–0.7)
EOSINOPHIL NFR BLD AUTO: 3 %
ERYTHROCYTE [DISTWIDTH] IN BLOOD BY AUTOMATED COUNT: 12.9 % (ref 10–15)
FASTING STATUS PATIENT QL REPORTED: YES
FASTING STATUS PATIENT QL REPORTED: YES
GLUCOSE SERPL-MCNC: 105 MG/DL (ref 70–99)
HCO3 SERPL-SCNC: 25 MMOL/L (ref 22–29)
HCT VFR BLD AUTO: 44.3 % (ref 40–53)
HCV AB SERPL QL IA: NONREACTIVE
HDLC SERPL-MCNC: 26 MG/DL
HGB BLD-MCNC: 15.6 G/DL (ref 13.3–17.7)
IMM GRANULOCYTES # BLD: 0 10E3/UL
IMM GRANULOCYTES NFR BLD: 0 %
LDLC SERPL CALC-MCNC: 101 MG/DL
LYMPHOCYTES # BLD AUTO: 2 10E3/UL (ref 0.8–5.3)
LYMPHOCYTES NFR BLD AUTO: 27 %
MCH RBC QN AUTO: 30.2 PG (ref 26.5–33)
MCHC RBC AUTO-ENTMCNC: 35.2 G/DL (ref 31.5–36.5)
MCV RBC AUTO: 86 FL (ref 78–100)
MICROALBUMIN UR-MCNC: 19.2 MG/L
MICROALBUMIN/CREAT UR: 9.23 MG/G CR (ref 0–17)
MONOCYTES # BLD AUTO: 0.6 10E3/UL (ref 0–1.3)
MONOCYTES NFR BLD AUTO: 8 %
NEUTROPHILS # BLD AUTO: 4.4 10E3/UL (ref 1.6–8.3)
NEUTROPHILS NFR BLD AUTO: 61 %
NONHDLC SERPL-MCNC: 173 MG/DL
PLATELET # BLD AUTO: 256 10E3/UL (ref 150–450)
POTASSIUM SERPL-SCNC: 4.5 MMOL/L (ref 3.4–5.3)
PROT SERPL-MCNC: 7.3 G/DL (ref 6.4–8.3)
RBC # BLD AUTO: 5.16 10E6/UL (ref 4.4–5.9)
SODIUM SERPL-SCNC: 140 MMOL/L (ref 135–145)
TRIGL SERPL-MCNC: 359 MG/DL
WBC # BLD AUTO: 7.2 10E3/UL (ref 4–11)

## 2025-06-11 PROCEDURE — 90471 IMMUNIZATION ADMIN: CPT | Performed by: PHYSICIAN ASSISTANT

## 2025-06-11 PROCEDURE — 80053 COMPREHEN METABOLIC PANEL: CPT | Performed by: PHYSICIAN ASSISTANT

## 2025-06-11 PROCEDURE — 82570 ASSAY OF URINE CREATININE: CPT | Performed by: PHYSICIAN ASSISTANT

## 2025-06-11 PROCEDURE — 3077F SYST BP >= 140 MM HG: CPT | Performed by: PHYSICIAN ASSISTANT

## 2025-06-11 PROCEDURE — G2211 COMPLEX E/M VISIT ADD ON: HCPCS | Performed by: PHYSICIAN ASSISTANT

## 2025-06-11 PROCEDURE — 80061 LIPID PANEL: CPT | Performed by: PHYSICIAN ASSISTANT

## 2025-06-11 PROCEDURE — 36415 COLL VENOUS BLD VENIPUNCTURE: CPT | Performed by: PHYSICIAN ASSISTANT

## 2025-06-11 PROCEDURE — 85025 COMPLETE CBC W/AUTO DIFF WBC: CPT | Performed by: PHYSICIAN ASSISTANT

## 2025-06-11 PROCEDURE — 82043 UR ALBUMIN QUANTITATIVE: CPT | Performed by: PHYSICIAN ASSISTANT

## 2025-06-11 PROCEDURE — 86803 HEPATITIS C AB TEST: CPT | Performed by: PHYSICIAN ASSISTANT

## 2025-06-11 PROCEDURE — 99214 OFFICE O/P EST MOD 30 MIN: CPT | Mod: 25 | Performed by: PHYSICIAN ASSISTANT

## 2025-06-11 PROCEDURE — 3080F DIAST BP >= 90 MM HG: CPT | Performed by: PHYSICIAN ASSISTANT

## 2025-06-11 PROCEDURE — 99396 PREV VISIT EST AGE 40-64: CPT | Mod: 25 | Performed by: PHYSICIAN ASSISTANT

## 2025-06-11 PROCEDURE — 90715 TDAP VACCINE 7 YRS/> IM: CPT | Performed by: PHYSICIAN ASSISTANT

## 2025-06-11 RX ORDER — AMLODIPINE AND VALSARTAN 5; 160 MG/1; MG/1
1 TABLET ORAL DAILY
Qty: 90 TABLET | Refills: 3 | Status: SHIPPED | OUTPATIENT
Start: 2025-06-11

## 2025-06-11 ASSESSMENT — ASTHMA QUESTIONNAIRES
QUESTION_4 LAST FOUR WEEKS HOW OFTEN HAVE YOU USED YOUR RESCUE INHALER OR NEBULIZER MEDICATION (SUCH AS ALBUTEROL): ONE OR TWO TIMES PER DAY
QUESTION_3 LAST FOUR WEEKS HOW OFTEN DID YOUR ASTHMA SYMPTOMS (WHEEZING, COUGHING, SHORTNESS OF BREATH, CHEST TIGHTNESS OR PAIN) WAKE YOU UP AT NIGHT OR EARLIER THAN USUAL IN THE MORNING: NOT AT ALL
QUESTION_2 LAST FOUR WEEKS HOW OFTEN HAVE YOU HAD SHORTNESS OF BREATH: NOT AT ALL
QUESTION_5 LAST FOUR WEEKS HOW WOULD YOU RATE YOUR ASTHMA CONTROL: COMPLETELY CONTROLLED
ACT_TOTALSCORE: 22
QUESTION_1 LAST FOUR WEEKS HOW MUCH OF THE TIME DID YOUR ASTHMA KEEP YOU FROM GETTING AS MUCH DONE AT WORK, SCHOOL OR AT HOME: NONE OF THE TIME
ACT_TOTALSCORE: 22

## 2025-06-11 NOTE — PROGRESS NOTES
Preventive Care Visit  [facility]  Rey Flynn PA-C, Family Medicine  Jun 11, 2025      Assessment & Plan     Screening for diabetes mellitus  Due.  Metabolic panel pending.    Screening for HIV (human immunodeficiency virus)  Not by patient    Need for hepatitis C screening test    - Hepatitis C Screen Reflex to HCV RNA Quant and Genotype; Future    Screening for cardiovascular condition      Mild intermittent reactive airway disease without complication  Stable rare use of albuterol has plenty of this leftover per patient.  Will continue to monitor ACT's at 6-month intervals.  Well-controlled today.  Follow-up sooner if concerns    Snoring  Reported given secondary hypertension recommending consult with sleep medicine for evaluation and possible home sleep study for possible CRISTINO.  Referral placed  - Adult Sleep Eval & Management  Referral; Future    Primary hypertension  Noted on history and exam secondary causes through renal function and sleep apnea pending.  Otherwise given this level recommending 2 medication treatment.  Home monitoring recommended as well.  Urine albumin.  Baseline labs today start Exforge for recheck in 1 month at nurse only lab only appointment for recheck BMP.  - Comprehensive metabolic panel (BMP + Alb, Alk Phos, ALT, AST, Total. Bili, TP); Future  - Albumin Random Urine Quantitative with Creat Ratio; Future  - Adult Sleep Eval & Management  Referral; Future  - amLODIPine-valsartan (EXFORGE) 5-160 MG tablet; Take 1 tablet by mouth daily.  - Home Blood Pressure Monitor Order for DME - ONLY FOR DME  Medication use and side effects discussed with the patient. Patient is in complete understanding and agreement with plan.     Routine general medical examination at a health care facility  Other than above, stable exam. Discussed diet and exercise habits. Recheck labs and follow up annuallly.   - Lipid panel reflex to direct LDL Fasting;  "Future  - CBC with platelets and differential; Future    Patient has been advised of split billing requirements and indicates understanding: Yes        BMI  Estimated body mass index is 34.37 kg/m  as calculated from the following:    Height as of this encounter: 1.905 m (6' 3\").    Weight as of this encounter: 124.7 kg (275 lb).   Weight management plan: Discussed healthy diet and exercise guidelines    Counseling  Appropriate preventive services were addressed with this patient via screening, questionnaire, or discussion as appropriate for fall prevention, nutrition, physical activity, Tobacco-use cessation, social engagement, weight loss and cognition.  Checklist reviewing preventive services available has been given to the patient.  Reviewed patient's diet, addressing concerns and/or questions.   He is at risk for lack of exercise and has been provided with information to increase physical activity for the benefit of his well-being.   The patient was instructed to see the dentist every 6 months.         Gely Fontana is a 39 year old, presenting for the following:  Physical (Fasting)        6/11/2025     6:57 AM   Additional Questions   Roomed by Mariia AGUIAR CMA   Accompanied by Self          HPI  Patient is a pleasant 39-year-old male with a past history of reactive airway disease GERD by upper respiratory illnesses and past pneumonias.  Otherwise no known past medical history.  He presents today to establish care as well as his wellness visit and to discuss ongoing hypertension.  He persistently has elevated blood pressures when he presents to clinic in the past.  Denies any headaches, shortness of breath, chest pains, palpitations, fatigue or dizziness at baseline.  Family history of heart disease in paternal grandfather otherwise no known family history of hypertension.  Patient does snore but felt that he is well rested overnight.  No known history of sleep apnea.  Has not been seen for preventative " care in the past.         Advance Care Planning    Discussed advance care planning with patient; however, patient declined at this time.        6/6/2025   General Health   How would you rate your overall physical health? Good   Feel stress (tense, anxious, or unable to sleep) Only a little   (!) STRESS CONCERN      6/6/2025   Nutrition   Three or more servings of calcium each day? Yes   Diet: Regular (no restrictions)   How many servings of fruit and vegetables per day? (!) 2-3   How many sweetened beverages each day? (!) 2         6/6/2025   Exercise   Days per week of moderate/strenous exercise 1 day   Average minutes spent exercising at this level 30 min   (!) EXERCISE CONCERN      6/6/2025   Social Factors   Frequency of gathering with friends or relatives Once a week   Worry food won't last until get money to buy more No   Food not last or not have enough money for food? No   Do you have housing? (Housing is defined as stable permanent housing and does not include staying outside in a car, in a tent, in an abandoned building, in an overnight shelter, or couch-surfing.) Yes   Are you worried about losing your housing? No   Lack of transportation? No   Unable to get utilities (heat,electricity)? No         6/6/2025   Dental   Dentist two times every year? (!) NO         Today's PHQ-2 Score:       6/11/2025     6:38 AM   PHQ-2 ( 1999 [employer])   Q1: Little interest or pleasure in doing things 0   Q2: Feeling down, depressed or hopeless 0   PHQ-2 Score 0    Q1: Little interest or pleasure in doing things Not at all   Q2: Feeling down, depressed or hopeless Not at all   PHQ-2 Score 0       Patient-reported           6/6/2025   Substance Use   Alcohol more than 3/day or more than 7/wk No   Do you use any other substances recreationally? No     Social History     Tobacco Use    Smoking status: Never     Passive exposure: Never    Smokeless tobacco: Former     Quit date: 9/15/2011   Vaping Use    Vaping status: Never  "Used   Substance Use Topics    Alcohol use: Yes     Alcohol/week: 0.0 - 1.7 standard drinks of alcohol    Drug use: No             6/6/2025   One time HIV Screening   Previous HIV test? No         6/6/2025   STI Screening   New sexual partner(s) since last STI/HIV test? No   ASCVD Risk   The ASCVD Risk score (Giovanna GEORGES, et al., 2019) failed to calculate for the following reasons:    Cannot find a previous HDL lab    Cannot find a previous total cholesterol lab        6/6/2025   Contraception/Family Planning   Questions about contraception or family planning No        Reviewed and updated as needed this visit by Provider   Tobacco  Allergies  Meds  Problems  Med Hx  Surg Hx  Fam Hx                   Objective    Exam  BP (!) 167/107 (BP Location: Left arm, Patient Position: Sitting, Cuff Size: Adult Large)   Pulse 79   Temp 98.7  F (37.1  C) (Oral)   Resp 16   Ht 1.905 m (6' 3\")   Wt 124.7 kg (275 lb)   SpO2 96%   BMI 34.37 kg/m     Estimated body mass index is 34.37 kg/m  as calculated from the following:    Height as of this encounter: 1.905 m (6' 3\").    Weight as of this encounter: 124.7 kg (275 lb).    Physical Exam  GENERAL: alert, no distress, and obese  EYES: Eyes grossly normal to inspection, PERRL and conjunctivae and sclerae normal  HENT: ear canals and TM's normal, nose and mouth without ulcers or lesions  NECK: no adenopathy, no asymmetry, masses, or scars  RESP: lungs clear to auscultation - no rales, rhonchi or wheezes  CV: regular rate and rhythm, normal S1 S2, no S3 or S4, no murmur, click or rub, no peripheral edema  ABDOMEN: soft, nontender, no hepatosplenomegaly, no masses and bowel sounds normal  MS: no gross musculoskeletal defects noted, no edema  SKIN: no suspicious lesions or rashes  NEURO: Normal strength and tone, mentation intact and speech normal  PSYCH: mentation appears normal, affect normal/bright  LYMPH: no cervical adenopathy        Signed Electronically by: " Rey Flynn PA-C

## 2025-06-11 NOTE — PATIENT INSTRUCTIONS
Patient Education   Preventive Care Advice   This is general advice given by our system to help you stay healthy. However, your care team may have specific advice just for you. Please talk to your care team about your preventive care needs.  Nutrition  Eat 5 or more servings of fruits and vegetables each day.  Try wheat bread, brown rice and whole grain pasta (instead of white bread, rice, and pasta).  Get enough calcium and vitamin D. Check the label on foods and aim for 100% of the RDA (recommended daily allowance).  Lifestyle  Exercise at least 150 minutes each week  (30 minutes a day, 5 days a week).  Do muscle strengthening activities 2 days a week. These help control your weight and prevent disease.  No smoking.  Wear sunscreen to prevent skin cancer.  Have a dental exam and cleaning every 6 months.  Yearly exams  See your health care team every year to talk about:  Any changes in your health.  Any medicines your care team has prescribed.  Preventive care, family planning, and ways to prevent chronic diseases.  Shots (vaccines)   HPV shots (up to age 26), if you've never had them before.  Hepatitis B shots (up to age 59), if you've never had them before.  COVID-19 shot: Get this shot when it's due.  Flu shot: Get a flu shot every year.  Tetanus shot: Get a tetanus shot every 10 years.  Pneumococcal, hepatitis A, and RSV shots: Ask your care team if you need these based on your risk.  Shingles shot (for age 50 and up)  General health tests  Diabetes screening:  Starting at age 35, Get screened for diabetes at least every 3 years.  If you are younger than age 35, ask your care team if you should be screened for diabetes.  Cholesterol test: At age 39, start having a cholesterol test every 5 years, or more often if advised.  Bone density scan (DEXA): At age 50, ask your care team if you should have this scan for osteoporosis (brittle bones).  Hepatitis C: Get tested at least once in your life.  STIs (sexually  transmitted infections)  Before age 24: Ask your care team if you should be screened for STIs.  After age 24: Get screened for STIs if you're at risk. You are at risk for STIs (including HIV) if:  You are sexually active with more than one person.  You don't use condoms every time.  You or a partner was diagnosed with a sexually transmitted infection.  If you are at risk for HIV, ask about PrEP medicine to prevent HIV.  Get tested for HIV at least once in your life, whether you are at risk for HIV or not.  Cancer screening tests  Cervical cancer screening: If you have a cervix, begin getting regular cervical cancer screening tests starting at age 21.  Breast cancer scan (mammogram): If you've ever had breasts, begin having regular mammograms starting at age 40. This is a scan to check for breast cancer.  Colon cancer screening: It is important to start screening for colon cancer at age 45.  Have a colonoscopy test every 10 years (or more often if you're at risk) Or, ask your provider about stool tests like a FIT test every year or Cologuard test every 3 years.  To learn more about your testing options, visit:   .  For help making a decision, visit:   https://bit.ly/tu65283.  Prostate cancer screening test: If you have a prostate, ask your care team if a prostate cancer screening test (PSA) at age 55 is right for you.  Lung cancer screening: If you are a current or former smoker ages 50 to 80, ask your care team if ongoing lung cancer screenings are right for you.  For informational purposes only. Not to replace the advice of your health care provider. Copyright   2023 [employer]. All rights reserved. Clinically reviewed by the [facility] Transitions Program. Netlist [id number] - REV 01/24.

## 2025-06-12 ENCOUNTER — RESULTS FOLLOW-UP (OUTPATIENT)
Dept: FAMILY MEDICINE | Facility: CLINIC | Age: 40
End: 2025-06-12

## 2025-06-12 ENCOUNTER — PATIENT OUTREACH (OUTPATIENT)
Dept: CARE COORDINATION | Facility: CLINIC | Age: 40
End: 2025-06-12
Payer: COMMERCIAL

## 2025-06-16 ENCOUNTER — PATIENT OUTREACH (OUTPATIENT)
Dept: CARE COORDINATION | Facility: CLINIC | Age: 40
End: 2025-06-16
Payer: COMMERCIAL

## 2025-07-14 ENCOUNTER — LAB (OUTPATIENT)
Dept: LAB | Facility: CLINIC | Age: 40
End: 2025-07-14
Payer: COMMERCIAL

## 2025-07-14 ENCOUNTER — ALLIED HEALTH/NURSE VISIT (OUTPATIENT)
Dept: FAMILY MEDICINE | Facility: CLINIC | Age: 40
End: 2025-07-14
Payer: COMMERCIAL

## 2025-07-14 VITALS — SYSTOLIC BLOOD PRESSURE: 127 MMHG | DIASTOLIC BLOOD PRESSURE: 86 MMHG | HEART RATE: 73 BPM

## 2025-07-14 DIAGNOSIS — I10 PRIMARY HYPERTENSION: ICD-10-CM

## 2025-07-14 DIAGNOSIS — Z01.30 BLOOD PRESSURE CHECK: Primary | ICD-10-CM

## 2025-07-14 LAB
ANION GAP SERPL CALCULATED.3IONS-SCNC: 9 MMOL/L (ref 7–15)
BUN SERPL-MCNC: 13.9 MG/DL (ref 6–20)
CALCIUM SERPL-MCNC: 9.5 MG/DL (ref 8.8–10.4)
CHLORIDE SERPL-SCNC: 106 MMOL/L (ref 98–107)
CREAT SERPL-MCNC: 1.01 MG/DL (ref 0.67–1.17)
EGFRCR SERPLBLD CKD-EPI 2021: >90 ML/MIN/1.73M2
GLUCOSE SERPL-MCNC: 105 MG/DL (ref 70–99)
HCO3 SERPL-SCNC: 25 MMOL/L (ref 22–29)
POTASSIUM SERPL-SCNC: 4.7 MMOL/L (ref 3.4–5.3)
SODIUM SERPL-SCNC: 140 MMOL/L (ref 135–145)

## 2025-07-14 PROCEDURE — 3074F SYST BP LT 130 MM HG: CPT

## 2025-07-14 PROCEDURE — 3079F DIAST BP 80-89 MM HG: CPT

## 2025-07-14 PROCEDURE — 36415 COLL VENOUS BLD VENIPUNCTURE: CPT

## 2025-07-14 PROCEDURE — 80048 BASIC METABOLIC PNL TOTAL CA: CPT

## 2025-07-14 PROCEDURE — 99207 PR NO CHARGE NURSE ONLY: CPT

## 2025-07-14 NOTE — PROGRESS NOTES
"Follow Up Blood Pressure Check    Addi Sherman is a 39 year old male recommended to follow up for blood pressure check by Rey Flynn. Anihypertensive medications and adherence were verified: Yes.   -amlodipine valsartan 5-160 mg every day     Reason for visit: Elevated BP at recent office visit    Medication change at last visit:   Per 6/11 OV notes,   \"Primary hypertension  Noted on history and exam secondary causes through renal function and sleep apnea pending.  Otherwise given this level recommending 2 medication treatment.  Home monitoring recommended as well.  Urine albumin.  Baseline labs today start Exforge for recheck in 1 month at nurse only lab only appointment for recheck BMP.\"    Today's Vitals:   Vitals:    07/14/25 0848 07/14/25 0906   BP: (!) 143/95 127/86   BP Location: Left arm Left arm   Patient Position: Chair Chair   Cuff Size: Adult Large Adult Large   Pulse: 75 73       Home blood pressure readings brought in today:   N/A    Lowest blood pressure today is less than 140/90 and they deny signs or symptoms of new onset: severe headache, fatigue, confusion, vision changes, chest pain, pounding in the chest, neck, ears, irregular heartbeat, difficulty breathing, and blood in the urine.  Please inform patient of his/her blood pressure today.  If they are asymptomatic, the patient is to continue current medications.  This message will be routed to their provider, and they will be notified if a change in medication is recommended.      Zakia Choi RN    Current Outpatient Medications   Medication Sig Dispense Refill    albuterol (PROAIR HFA/PROVENTIL HFA/VENTOLIN HFA) 108 (90 Base) MCG/ACT inhaler Inhale 2 puffs into the lungs every 4 hours as needed for shortness of breath, wheezing or cough. 18 g 0    amLODIPine-valsartan (EXFORGE) 5-160 MG tablet Take 1 tablet by mouth daily. 90 tablet 3       "